# Patient Record
Sex: MALE | Race: WHITE | NOT HISPANIC OR LATINO | Employment: UNEMPLOYED | URBAN - METROPOLITAN AREA
[De-identification: names, ages, dates, MRNs, and addresses within clinical notes are randomized per-mention and may not be internally consistent; named-entity substitution may affect disease eponyms.]

---

## 2017-06-29 ENCOUNTER — OFFICE VISIT (OUTPATIENT)
Dept: FAMILY MEDICINE | Facility: CLINIC | Age: 37
End: 2017-06-29

## 2017-06-29 VITALS
RESPIRATION RATE: 20 BRPM | HEIGHT: 69 IN | WEIGHT: 156 LBS | TEMPERATURE: 97.3 F | HEART RATE: 99 BPM | OXYGEN SATURATION: 97 % | BODY MASS INDEX: 23.11 KG/M2 | DIASTOLIC BLOOD PRESSURE: 80 MMHG | SYSTOLIC BLOOD PRESSURE: 123 MMHG

## 2017-06-29 DIAGNOSIS — Z23 IMMUNIZATION DUE: Primary | ICD-10-CM

## 2017-06-29 NOTE — MR AVS SNAPSHOT
After Visit Summary   6/29/2017    Augustine Matthews    MRN: [id number]           Patient Information     Date Of Birth          1980        Visit Information        Provider Department      6/29/2017 8:20 AM Pastor Borges MD [facility]        Care Instructions    Your medication list is printed, please keep this with you, it is helpful to bring this current list to any other medical appointments, the emergency room or hospital.    If you had lab testing today and your results are reassuring or normal they will be be mailed to you within 7 days.     If the lab tests need quick action we will call you with the results.   The phone number we will call with results is # [phone] (home) . If this is not the best number please call our clinic and change the number.    If you need any refills please call your pharmacy and they will contact us.    If you have any further concerns or wish to schedule another appointment you must call our office during normal business hours  [phone] (8-5:00 M-F)  If you have urgent medical questions that cannot wait  you may also call [phone] at any time of day.  If you have a medical emergency please call 221.    Thank you for coming to [facility].            Follow-ups after your visit        Who to contact     Please call your clinic at [phone] to:    Ask questions about your health    Make or cancel appointments    Discuss your medicines    Learn about your test results    Speak to your doctor   If you have compliments or concerns about an experience at your clinic, or if you wish to file a complaint, please contact [facility] Patient Relations at [phone] or email us at [email or web address]         Additional Information About Your Visit        Care EveryWhere ID     This is your Care EveryWhere ID. This could be used by other organizations to access your [facility]  "records  [id number]        Your Vitals Were     Pulse Temperature Respirations Height Pulse Oximetry BMI (Body Mass Index)    99 97.3  F (36.3  C) 20 5' 9\" (175.3 cm) 97% 23.04 kg/m2       Blood Pressure from Last 3 Encounters:   06/29/17 123/80    Weight from Last 3 Encounters:   06/29/17 156 lb (70.8 kg)              Today, you had the following     No orders found for display       Primary Care Provider Office Phone # Fax #    Pastor Borges [phone]       [facility] [address] [place] [address]        Equal Access to Services     [facility] : Hadii karen salazar hadasho Sorina, waaxda luqadaha, qaybta kaalmada adeegyada, miguel ángel napier . So [facility] [phone].    ATENCIÓN: Si habla español, tiene a mejia disposición servicios gratuitos de asistencia lingüística. Llame al [phone].    We comply with applicable federal civil rights laws and [place] laws. We do not discriminate on the basis of race, color, national origin, age, disability sex, sexual orientation or gender identity.            Thank you!     Thank you for choosing [facility]  for your care. Our goal is always to provide you with excellent care. Hearing back from our patients is one way we can continue to improve our services. Please take a few minutes to complete the written survey that you may receive in the mail after your visit with us. Thank you!             Your Updated Medication List - Protect others around you: Learn how to safely use, store and throw away your medicines at www.disposemymeds.org.      Notice  As of 6/29/2017  9:10 AM    You have not been prescribed any medications.      "

## 2017-06-29 NOTE — PROGRESS NOTES
HPI:       Augustine Matthews is a 37 year old  male who presents for   1. Anxiety  2. Back problem  3. Asthma when younger, now seems resolved  Working at [employer].  Works every day, a shift lasts about 5 hours.  Works from 3.5 to 5 hours per day.  Sometimes his back hurts.  He as a history of degenerative disease of his back, and appears to be having some difficulty moving around today. Pain when he rises to a stnd, or gets of the exam tab.e, or rises from lying down on the exam table.    Has to walk about a mile to get to work and back.    Patient states he has been treated for ADHD when he was young.  No therapy since then.  He was staying with his mother, but had a falling out.  Was then homeless for two years.  Now he is staying in a supportive environment with a male room mate. .  Single, never .  Has four children, they are in the area, and he sees them regularly.    Not on any pills now.    Tobacco- none  Etoh- none  Drugs - none    Family history - Mother has history for depression.  Uncles with depression.     SH- Dropped out of high school at tenth grade. No further education. Happy with the job at [employer], and is afraid if he looses his housing he will also loose his job.    Tetanus - last updated 2006    Mild intermittent asthma, has not had albuterol in several years.                     PMHX:   Current Medications:   No current outpatient prescriptions on file.       Existing Problems  There is no problem list on file for this patient.      Allergies:  Allergies   Allergen Reactions     No Known Allergies        Previous labs:  Lab Results   Component Value Date    .0 06/14/2012    BUN 8.0 06/14/2012    CO2 27.0 06/14/2012               Review of Systems:    CONSTITUTIONAL: no fatigue, no unexpected change in weight  SKIN: no worrisome rashes, no worrisome moles, no worrisome lesions  EYES: no acute vision problems or changes  ENT: no ear problems, no mouth problems, no throat  "problems  RESP: no significant cough, no shortness of breath  CV: no chest pain, no palpitations, no new or worsening peripheral edema  GI: no nausea, no vomiting, no constipation, no diarrhea          Physical Exam:     Vitals:    06/29/17 0831   BP: 123/80   Pulse: 99   Resp: 20   Temp: 97.3  F (36.3  C)   SpO2: 97%   Weight: 156 lb (70.8 kg)   Height: 5' 9\" (175.3 cm)     Body mass index is 23.04 kg/(m^2).    GENERAL:alert, well hydrated, no distress  EYES: Eyes grossly normal to inspection, extraocular movements - intact, and PERRL  HENT: ear canals- normal; TMs- normal; Nose- normal; Mouth- no ulcers, no lesions  NECK: no tenderness, no adenopathy, no asymmetry, no masses, no stiffness; thyroid- normal to palpation  RESP: lungs clear to auscultation - no rales, no rhonchi, no wheezes  CV: regular rates and rhythm, normal S1 S2, no S3 or S4 and no murmur, no click or rub -  Back-subjective limitation to bending to 45 degrees from vertical.  Pain localizing in lower back.No weakness or sensory change in the legs bilaterally.                Labs and Procedures     Historic Results on 06/14/2012   Component Date Value Ref Range Status     Glucose 06/14/2012 111.0* 60.0 - 109.0 mg/dL Final     Urea Nitrogen 06/14/2012 8.0  5.0 - 24.0 mg/dL Final     Creatinine 06/14/2012 1.1  0.8 - 1.5 mg/dL Final     Sodium 06/14/2012 140.0  133.0 - 144.0 mmol/L Final     Potassium 06/14/2012 4.1  3.4 - 5.3 mmol/L Final     Chloride 06/14/2012 103.0  94.0 - 109.0 mmol/L Final     Carbon Dioxide 06/14/2012 27.0  20.0 - 32.0 mmol/L Final     Calcium 06/14/2012 9.3  8.5 - 10.4 mg/dL Final     eGFR Calculated (Non Black Referen* 06/14/2012 82.5  >60.0 ml/min Final     eGFR Calculated (Black Reference) 06/14/2012 99.8  >60.0 ml/min Final              Assessment and Plan     1.Is maintaining a job at [employer] and staying is assisted living.  Walks to work.  2. Lifting and bending are limited by chronic low back pain.  This does not seem to " be an issue in his current position, but is a limiting factor for other forms of work. Can lift up to 40 pounds, but not often or repeatedly.   3. Low health literacy with completion of 9th grade education.  Learning disability by history.  Will need continued support for maintaining job, home, and other capabilities.   4. Completed forms patient brought with him concerning documentation of limitations related to home living situation.          Options for treatment and follow-up care were reviewed with the patient and/or guardian. Augustine Matthews and/or guardian engaged in the decision making process and verbalized understanding of the options discussed and agreed with the final plan.    Pastor Borges MD

## 2017-06-29 NOTE — PATIENT INSTRUCTIONS
Your medication list is printed, please keep this with you, it is helpful to bring this current list to any other medical appointments, the emergency room or hospital.    If you had lab testing today and your results are reassuring or normal they will be be mailed to you within 7 days.     If the lab tests need quick action we will call you with the results.   The phone number we will call with results is # [phone] (home) . If this is not the best number please call our clinic and change the number.    If you need any refills please call your pharmacy and they will contact us.    If you have any further concerns or wish to schedule another appointment you must call our office during normal business hours  [phone] (8-5:00 M-F)  If you have urgent medical questions that cannot wait  you may also call [phone] at any time of day.  If you have a medical emergency please call [phone].    Thank you for coming to [facility].

## 2017-06-30 ASSESSMENT — ASTHMA QUESTIONNAIRES: ACT_TOTALSCORE: 13

## 2017-07-17 PROBLEM — J45.909 UNCOMPLICATED ASTHMA: Status: ACTIVE | Noted: 2017-07-17

## 2017-07-17 PROBLEM — J45.909 UNCOMPLICATED ASTHMA: Status: RESOLVED | Noted: 2017-07-17 | Resolved: 2017-07-17

## 2017-07-23 ENCOUNTER — COMMUNICATION - HEALTHEAST (OUTPATIENT)
Dept: SCHEDULING | Facility: CLINIC | Age: 37
End: 2017-07-23

## 2017-07-27 ENCOUNTER — AMBULATORY - HEALTHEAST (OUTPATIENT)
Dept: SURGERY | Facility: CLINIC | Age: 37
End: 2017-07-27

## 2017-07-27 ENCOUNTER — OFFICE VISIT (OUTPATIENT)
Dept: FAMILY MEDICINE | Facility: CLINIC | Age: 37
End: 2017-07-27

## 2017-07-27 VITALS
DIASTOLIC BLOOD PRESSURE: 83 MMHG | BODY MASS INDEX: 22.46 KG/M2 | WEIGHT: 156.9 LBS | HEART RATE: 84 BPM | HEIGHT: 70 IN | TEMPERATURE: 97.9 F | SYSTOLIC BLOOD PRESSURE: 121 MMHG | OXYGEN SATURATION: 97 %

## 2017-07-27 DIAGNOSIS — K40.90 LEFT INGUINAL HERNIA: ICD-10-CM

## 2017-07-27 DIAGNOSIS — K40.90 UNILATERAL INGUINAL HERNIA WITHOUT OBSTRUCTION OR GANGRENE, RECURRENCE NOT SPECIFIED: Primary | ICD-10-CM

## 2017-07-27 DIAGNOSIS — J45.20 MILD INTERMITTENT ASTHMA WITHOUT COMPLICATION: ICD-10-CM

## 2017-07-27 RX ORDER — ALBUTEROL SULFATE 90 UG/1
1-2 AEROSOL, METERED RESPIRATORY (INHALATION) EVERY 6 HOURS PRN
Qty: 2 INHALER | Refills: 1 | Status: SHIPPED | OUTPATIENT
Start: 2017-07-27 | End: 2017-09-28

## 2017-07-27 RX ORDER — OXYCODONE AND ACETAMINOPHEN 5; 325 MG/1; MG/1
1 TABLET ORAL EVERY 4 HOURS PRN
Qty: 30 TABLET | Refills: 0 | Status: SHIPPED | OUTPATIENT
Start: 2017-07-27 | End: 2018-06-11

## 2017-07-27 NOTE — PATIENT INSTRUCTIONS
Your medication list is printed, please keep this with you, it is helpful to bring this current list to any other medical appointments, the emergency room or hospital.    If you had lab testing today and your results are reassuring or normal they will be be mailed to you within 7 days.     If the lab tests need quick action we will call you with the results.   The phone number we will call with results is # [phone] (home) . If this is not the best number please call our clinic and change the number.    If you need any refills please call your pharmacy and they will contact us.    If you have any further concerns or wish to schedule another appointment you must call our office during normal business hours  [phone] (8-5:00 M-F)  If you have urgent medical questions that cannot wait  you may also call [phone] at any time of day.  If you have a medical emergency please call [phone].    Thank you for coming to [facility].      Referral for ( TEST )  :      General Surgery  LOCATION/PLACE/Provider :     Surgery [address] [place] [address], [address]  [place] [address]  DATE & TIME :     7- at 10:30am  PHONE :     [phone]  FAX :     [phone]  ADDITIONAL INFORMATION :     NA  Appointment made by clinic staff/:    ANUEL    8/2/17 Surgical notes to Dr. Borges. PRETTY Saha (c)

## 2017-07-27 NOTE — PROGRESS NOTES
"       HPI:       Augustine Matthews is a 37 year old  male who presents for presents for onset of a left sided hrnia.   Patient was working when he sudeenly noted the onset of a left sided inhuinal pain assocaited with a small hernia.  Went to ER where he was advised that he had a hernia, and given some pain medication. Presents today for further evaluation.   Patient presents with 8 cm bulge in the right inguinal canal.  Not fully reducible. But decompresses when the patient is lying down. Made worse by lifting.  Patient has been aware of the presence of a small hernia for a long time, but it recently has increased in size dramatically.      History of mild intermittent asthma, and would like a refill of albuterol.            PMHX:   Current Medications:   No current outpatient prescriptions on file.       Existing Problems  Patient Active Problem List   Diagnosis   (none) - all problems resolved or deleted       Allergies:  Allergies   Allergen Reactions     No Known Allergies        Previous labs:  Lab Results   Component Value Date    .0 06/14/2012    BUN 8.0 06/14/2012    CO2 27.0 06/14/2012               Review of Systems:    CONSTITUTIONAL: no fatigue, no unexpected change in weight  SKIN: no worrisome rashes, no worrisome moles, no worrisome lesions  EYES: no acute vision problems or changes  ENT: no ear problems, no mouth problems, no throat problems  RESP: no significant cough, no shortness of breath  CV: no chest pain, no palpitations, no new or worsening peripheral edema  GI: no nausea, no vomiting, no constipation, no diarrhea          Physical Exam:     Vitals:    07/27/17 1146   BP: 121/83   BP Location: Right arm   Patient Position: Chair   Cuff Size: Adult Regular   Pulse: 84   Temp: 97.9  F (36.6  C)   TempSrc: Oral   SpO2: 97%   Weight: 156 lb 14.4 oz (71.2 kg)   Height: 5' 9.75\" (177.2 cm)     Body mass index is 22.67 kg/(m^2).    GENERAL:alert, well hydrated, no distress  EYES: Eyes " grossly normal to inspection, extraocular movements - intact, and PERRL  HENT:  Nose- normal; Mouth- no ulcers, no lesions  NECK: no tenderness, no adenopathy, no asymmetry, no masses, no stiffness;  RESP: lungs clear to auscultation - no rales, no rhonchi, no wheezes  CV: regular rates and rhythm, normal S1 S2, no S3 or S4 and no murmur, no click or rub -  ABDOMEN: soft, 8 cm bulge in the right inguinal canal, mild tenderness to palpation. Appears direct with a broad based defect. Soft, and easily compressible.                   Labs and Procedures     Historic Results on 06/14/2012   Component Date Value Ref Range Status     Glucose 06/14/2012 111.0* 60.0 - 109.0 mg/dL Final     Urea Nitrogen 06/14/2012 8.0  5.0 - 24.0 mg/dL Final     Creatinine 06/14/2012 1.1  0.8 - 1.5 mg/dL Final     Sodium 06/14/2012 140.0  133.0 - 144.0 mmol/L Final     Potassium 06/14/2012 4.1  3.4 - 5.3 mmol/L Final     Chloride 06/14/2012 103.0  94.0 - 109.0 mmol/L Final     Carbon Dioxide 06/14/2012 27.0  20.0 - 32.0 mmol/L Final     Calcium 06/14/2012 9.3  8.5 - 10.4 mg/dL Final     eGFR Calculated (Non Black Referen* 06/14/2012 82.5  >60.0 ml/min Final     eGFR Calculated (Black Reference) 06/14/2012 99.8  >60.0 ml/min Final              Assessment and Plan     1.Refer to surgery for repair of left inguinal hernia.   2. Refill albuterol. Patient uses this occasionally, but less than 2-3 times per week.       Options for treatment and follow-up care were reviewed with the patient and/or guardian. Augustine Matthews and/or guardian engaged in the decision making process and verbalized understanding of the options discussed and agreed with the final plan.    Pastor Borges MD

## 2017-07-27 NOTE — MR AVS SNAPSHOT
After Visit Summary   7/27/2017    Augustine Matthews    MRN: [id number]           Patient Information     Date Of Birth          1980        Visit Information        Provider Department      7/27/2017 11:40 AM Pastor Borges MD [facility]        Today's Diagnoses     Unilateral inguinal hernia without obstruction or gangrene, recurrence not specified    -  1    Mild intermittent asthma without complication          Care Instructions    Your medication list is printed, please keep this with you, it is helpful to bring this current list to any other medical appointments, the emergency room or hospital.    If you had lab testing today and your results are reassuring or normal they will be be mailed to you within 7 days.     If the lab tests need quick action we will call you with the results.   The phone number we will call with results is # [phone] (home) . If this is not the best number please call our clinic and change the number.    If you need any refills please call your pharmacy and they will contact us.    If you have any further concerns or wish to schedule another appointment you must call our office during normal business hours  [phone] (8-5:00 M-F)  If you have urgent medical questions that cannot wait  you may also call [phone] at any time of day.  If you have a medical emergency please call [phone].    Thank you for coming to [facility].            Follow-ups after your visit        Additional Services     GENERAL SURG ADULT REFERRAL       Reason for Referral: Left side inguinal hernia     needed: No  Language: English    May leave message on voicemail: Yes    ([facility] Only) Referral should be tracked (Yes/No)?                  Who to contact     Please call your clinic at [phone] to:    Ask questions about your health    Make or cancel appointments    Discuss your medicines    Learn about your test results    Speak to your doctor   If  "you have compliments or concerns about an experience at your clinic, or if you wish to file a complaint, please contact [facility] Patient Relations at [phone] or email us at [email or web address]         Additional Information About Your Visit        Care EveryWhere ID     This is your Care EveryWhere ID. This could be used by other organizations to access your [place] medical records  [id number]        Your Vitals Were     Pulse Temperature Height Pulse Oximetry BMI (Body Mass Index)       84 97.9  F (36.6  C) (Oral) 5' 9.75\" (177.2 cm) 97% 22.67 kg/m2        Blood Pressure from Last 3 Encounters:   07/27/17 121/83   06/29/17 123/80    Weight from Last 3 Encounters:   07/27/17 156 lb 14.4 oz (71.2 kg)   06/29/17 156 lb (70.8 kg)              We Performed the Following     GENERAL SURG ADULT REFERRAL          Today's Medication Changes          These changes are accurate as of: 7/27/17 12:11 PM.  If you have any questions, ask your nurse or doctor.               Start taking these medicines.        Dose/Directions    albuterol 108 (90 BASE) MCG/ACT Inhaler   Commonly known as:  PROAIR HFA/PROVENTIL HFA/VENTOLIN HFA   Used for:  Mild intermittent asthma without complication   Started by:  Pastor Borges MD        Dose:  1-2 puff   Inhale 1-2 puffs into the lungs every 6 hours as needed for shortness of breath / dyspnea or wheezing   Quantity:  2 Inhaler   Refills:  1       oxyCODONE-acetaminophen 5-325 MG per tablet   Commonly known as:  PERCOCET   Used for:  Unilateral inguinal hernia without obstruction or gangrene, recurrence not specified   Started by:  Pastor Borges MD        Dose:  1 tablet   Take 1 tablet by mouth every 4 hours as needed for pain maximum 8 tablet(s) per day   Quantity:  30 tablet   Refills:  0            Where to get your medicines      These medications were sent to [facility] [address] [place] [address] - [address] [place] [address] AT " [address] [address]    Hours:  24-hours Phone:  [phone]     albuterol 108 (90 BASE) MCG/ACT Inhaler         Some of these will need a paper prescription and others can be bought over the counter.  Ask your nurse if you have questions.     Bring a paper prescription for each of these medications     oxyCODONE-acetaminophen 5-325 MG per tablet                Primary Care Provider Office Phone # Fax #    Pastor Borges [phone]       [facility] [address] [place] [address]        Equal Access to Services     [facility]: Hadii aad ku hadasho Soomaali, waaxda luqadaha, qaybta kaalmada adeegyada, waxay idiin hayaan adeleah napier . So [facility] [phone].    ATENCIÓN: Si habla español, tiene a mejia disposición servicios gratuitos de asistencia lingüística. [facility] [phone].    We comply with applicable federal civil rights laws and [place] laws. We do not discriminate on the basis of race, color, national origin, age, disability sex, sexual orientation or gender identity.            Thank you!     Thank you for choosing [facility]  for your care. Our goal is always to provide you with excellent care. Hearing back from our patients is one way we can continue to improve our services. Please take a few minutes to complete the written survey that you may receive in the mail after your visit with us. Thank you!             Your Updated Medication List - Protect others around you: Learn how to safely use, store and throw away your medicines at www.disposemymeds.org.          This list is accurate as of: 7/27/17 12:11 PM.  Always use your most recent med list.                   Brand Name Dispense Instructions for use Diagnosis    albuterol 108 (90 BASE) MCG/ACT Inhaler    PROAIR HFA/PROVENTIL HFA/VENTOLIN HFA    2 Inhaler    Inhale 1-2 puffs into the lungs every 6 hours as needed for shortness of  breath / dyspnea or wheezing    Mild intermittent asthma without complication       oxyCODONE-acetaminophen 5-325 MG per tablet    PERCOCET    30 tablet    Take 1 tablet by mouth every 4 hours as needed for pain maximum 8 tablet(s) per day    Unilateral inguinal hernia without obstruction or gangrene, recurrence not specified

## 2017-07-31 ENCOUNTER — TRANSFERRED RECORDS (OUTPATIENT)
Dept: HEALTH INFORMATION MANAGEMENT | Facility: CLINIC | Age: 37
End: 2017-07-31

## 2017-07-31 ENCOUNTER — OFFICE VISIT - HEALTHEAST (OUTPATIENT)
Dept: SURGERY | Facility: CLINIC | Age: 37
End: 2017-07-31

## 2017-07-31 DIAGNOSIS — K40.20 BILATERAL INGUINAL HERNIA WITHOUT OBSTRUCTION OR GANGRENE, RECURRENCE NOT SPECIFIED: ICD-10-CM

## 2017-07-31 ASSESSMENT — MIFFLIN-ST. JEOR: SCORE: 1616.6

## 2017-08-01 ENCOUNTER — AMBULATORY - HEALTHEAST (OUTPATIENT)
Dept: SURGERY | Facility: CLINIC | Age: 37
End: 2017-08-01

## 2017-08-02 ENCOUNTER — RECORDS - HEALTHEAST (OUTPATIENT)
Dept: ADMINISTRATIVE | Facility: OTHER | Age: 37
End: 2017-08-02

## 2017-09-14 ENCOUNTER — AMBULATORY - HEALTHEAST (OUTPATIENT)
Dept: SURGERY | Facility: CLINIC | Age: 37
End: 2017-09-14

## 2017-09-14 ENCOUNTER — RECORDS - HEALTHEAST (OUTPATIENT)
Dept: ADMINISTRATIVE | Facility: OTHER | Age: 37
End: 2017-09-14

## 2017-09-18 ENCOUNTER — COMMUNICATION - HEALTHEAST (OUTPATIENT)
Dept: SURGERY | Facility: CLINIC | Age: 37
End: 2017-09-18

## 2017-09-18 DIAGNOSIS — G89.18 POST-OP PAIN: ICD-10-CM

## 2017-09-25 PROBLEM — J45.20 MILD INTERMITTENT ASTHMA WITHOUT COMPLICATION: Status: ACTIVE | Noted: 2017-09-25

## 2017-09-26 ENCOUNTER — RECORDS - HEALTHEAST (OUTPATIENT)
Dept: ADMINISTRATIVE | Facility: OTHER | Age: 37
End: 2017-09-26

## 2017-09-28 ENCOUNTER — OFFICE VISIT (OUTPATIENT)
Dept: FAMILY MEDICINE | Facility: CLINIC | Age: 37
End: 2017-09-28

## 2017-09-28 VITALS
HEIGHT: 69 IN | BODY MASS INDEX: 21.98 KG/M2 | DIASTOLIC BLOOD PRESSURE: 77 MMHG | SYSTOLIC BLOOD PRESSURE: 148 MMHG | WEIGHT: 148.4 LBS | TEMPERATURE: 97.5 F | HEART RATE: 115 BPM | OXYGEN SATURATION: 99 %

## 2017-09-28 DIAGNOSIS — K40.90 UNILATERAL INGUINAL HERNIA WITHOUT OBSTRUCTION OR GANGRENE, RECURRENCE NOT SPECIFIED: Primary | ICD-10-CM

## 2017-09-28 DIAGNOSIS — J45.20 MILD INTERMITTENT ASTHMA WITHOUT COMPLICATION: ICD-10-CM

## 2017-09-28 RX ORDER — ALBUTEROL SULFATE 90 UG/1
1-2 AEROSOL, METERED RESPIRATORY (INHALATION) EVERY 6 HOURS PRN
Qty: 2 INHALER | Refills: 3 | Status: SHIPPED | OUTPATIENT
Start: 2017-09-28 | End: 2018-06-11

## 2017-09-28 ASSESSMENT — PAIN SCALES - GENERAL: PAINLEVEL: EXTREME PAIN (8)

## 2017-09-28 NOTE — MR AVS SNAPSHOT
After Visit Summary   9/28/2017    Augustine Matthews    MRN: [id number]           Patient Information     Date Of Birth          1980        Visit Information        Provider Department      9/28/2017 11:40 AM Pastor Borges MD [facility]        Today's Diagnoses     Mild intermittent asthma without complication          Care Instructions    Start using Pulmicort or Budesonide 2 puffs at night and 2 puffs in the morning.      Your medication list is printed, please keep this with you, it is helpful to bring this current list to any other medical appointments, the emergency room or hospital.    If you had lab testing today and your results are reassuring or normal they will be be mailed to you within 7 days.     If the lab tests need quick action we will call you with the results.   The phone number we will call with results is # [phone] (home) . If this is not the best number please call our clinic and change the number.    If you need any refills please call your pharmacy and they will contact us.    If you have any further concerns or wish to schedule another appointment you must call our office during normal business hours  [phone] (8-5:00 M-F)  If you have urgent medical questions that cannot wait  you may also call [phone] at any time of day.  If you have a medical emergency please call [phone].    Thank you for coming to [facility].    My Asthma Action Plan  Name: Augustine Matthews  YOB: 1980  Date: 9/28/2017   My doctor: Pastor Borges   My clinic:   [facility] [address] [place] [address]  [phone]    My Asthma Severity: mild persistent Avoid your asthma triggers: dust mites and pollens      GREEN ZONE   Good Control    I feel good    No cough or wheeze    Can work, sleep and play without asthma symptoms       Take your asthma control medicine every day.  Take the medications listed below daily.    Pulmicort   90 mcg 2 puffs twice a day    1. If exercise triggers your asthma, take your rescue medication (2 puffs of albuterol, Ventolin/Pro-Air) 15 minutes before exercise or sports, and during exercise if you have asthma symptoms.  2. Spacer to use with inhaler: If you have a spacer, make sure to use it with your inhaler.              YELLOW ZONE Getting Worse  I have ANY of these:    I do not feel good    Cough or wheeze    Chest feels tight    Wake up at night   1. Keep taking your Green Zone medications.  2. Start taking your rescue medicine (1-2 puffs of albuterol - Ventolin/Pro-Air) every 4-6 hours as needed.  3. If symptoms are not controlled with above, can take 2 puffs every 20 minutes for up to 1 hour, then continue every 4 hours if needed.   4. If you do not return to the Green Zone in 12-24 hours or you get worse, call the clinic.         RED ZONE Medical Alert - Get Help  I have ANY of these:    I feel awful    Medicine is not helping    Breathing getting harder    Trouble walking or talking    Nose opens wide to breathe       1. Take your rescue medicine NOW (6-8 puffs of albuterol - Ventolin/Pro-Air) for every 20 minutes for up to 1 hour.  2. If your provider has prescribed an oral steroid medicine (Prednisone 40 mg), start taking it NOW.  3. Call your doctor NOW.  4. If you are still in the Red Zone after 20 minutes and you have not reached your doctor:    Take your rescue medicine again (6-8 puffs of albuterol - Ventolin/Pro-Air) and    Call 911 or go to the emergency room right away    See your regular doctor within 1 weeks of an Emergency Room or Urgent Care visit for follow-up treatment.        This Asthma Action Plan provides authorization for the administration of medication described in the AAP.  YES  This child has the knowledge and skills to self-administer rescue medication at school or  with approval of the school nurse.  YES    Electronically signed by: Pastor Sagastume  "Reminders:  Meet with Asthma Educator,  Flu Shot in the Fall, Pneumonia Shot  Pharmacy: [facility] [address] [place] [address] BRADY MONTOYA AT [facility]          Follow-ups after your visit        Who to contact     Please call your clinic at [phone] to:    Ask questions about your health    Make or cancel appointments    Discuss your medicines    Learn about your test results    Speak to your doctor   If you have compliments or concerns about an experience at your clinic, or if you wish to file a complaint, please contact [facility] Patient Relations at [phone] or email us at [email or web address]         Additional Information About Your Visit        Care EveryWhere ID     This is your Care EveryWhere ID. This could be used by other organizations to access your [facility] medical records  [id number]        Your Vitals Were     Pulse Temperature Height Pulse Oximetry BMI (Body Mass Index)       115 97.5  F (36.4  C) (Oral) 5' 8.75\" (174.6 cm) 99% 22.07 kg/m2        Blood Pressure from Last 3 Encounters:   09/28/17 148/77   07/27/17 121/83   06/29/17 123/80    Weight from Last 3 Encounters:   09/28/17 148 lb 6.4 oz (67.3 kg)   07/27/17 156 lb 14.4 oz (71.2 kg)   06/29/17 156 lb (70.8 kg)              We Performed the Following     Asthma Action Plan (AAP)          Today's Medication Changes          These changes are accurate as of: 9/28/17 12:15 PM.  If you have any questions, ask your nurse or doctor.               Start taking these medicines.        Dose/Directions    BUDESONIDE (INHALATION) 90 MCG/ACT Aepb   Used for:  Mild intermittent asthma without complication   Started by:  Pastor Borges MD        Dose:  2 puff   Inhale 2 puffs into the lungs 2 times daily   Quantity:  1 each   Refills:  1            Where to get your medicines      These medications were sent to [facility] [address] [place] [address] BRADY" [facility] AT [address] [address]    Hours:  24-hours Phone:  [phone]     albuterol 108 (90 BASE) MCG/ACT Inhaler    BUDESONIDE (INHALATION) 90 MCG/ACT Aepb                Primary Care Provider Office Phone # Fax #    Pastor Borges [phone]       [facility] [address] [place] [address]        Equal Access to Services     [facility] : Hadii aad ku hadasho Soomaali, waaxda luqadaha, qaybta kaalmada adeegyada, waxay idiin hayaan adeeg kharash la'aan . [facility] [phone].    ATENCIÓN: Si habla español, tiene a mejia disposición servicios gratuitos de asistencia lingüística. [facility] [phone].    We comply with applicable federal civil rights laws and [place] laws. We do not discriminate on the basis of race, color, national origin, age, disability sex, sexual orientation or gender identity.            Thank you!     Thank you for choosing [facility]  for your care. Our goal is always to provide you with excellent care. Hearing back from our patients is one way we can continue to improve our services. Please take a few minutes to complete the written survey that you may receive in the mail after your visit with us. Thank you!             Your Updated Medication List - Protect others around you: Learn how to safely use, store and throw away your medicines at www.disposemymeds.org.          This list is accurate as of: 9/28/17 12:15 PM.  Always use your most recent med list.                   Brand Name Dispense Instructions for use Diagnosis    albuterol 108 (90 BASE) MCG/ACT Inhaler    PROAIR HFA/PROVENTIL HFA/VENTOLIN HFA    2 Inhaler    Inhale 1-2 puffs into the lungs every 6 hours as needed for shortness of breath / dyspnea or wheezing    Mild intermittent asthma without complication       BUDESONIDE (INHALATION) 90 MCG/ACT Aepb     1 each    Inhale 2 puffs into the lungs 2 times daily    Mild  intermittent asthma without complication       oxyCODONE-acetaminophen 5-325 MG per tablet    PERCOCET    30 tablet    Take 1 tablet by mouth every 4 hours as needed for pain maximum 8 tablet(s) per day    Unilateral inguinal hernia without obstruction or gangrene, recurrence not specified

## 2017-09-28 NOTE — PROGRESS NOTES
HPI:       Augustine Matthews is a 37 year old  male who presents for recheck of hernia operation and asthma.   Patient had repair of left inguinal hernia two weeks ago. Has not seen the surgeon yet. Was concerned that he had persistent left groin and leg muscle spasms at times. No wound pain, but bandages still present. Dirty and blood stained.      Has been using more albuterol since his operation. Now using albuterol 1-2 times per day.  Will check Asthma action plan and provide a controller medicine for now.                PMHX:   Current Medications:   Current Outpatient Prescriptions   Medication Sig Dispense Refill     albuterol (PROAIR HFA/PROVENTIL HFA/VENTOLIN HFA) 108 (90 BASE) MCG/ACT Inhaler Inhale 1-2 puffs into the lungs every 6 hours as needed for shortness of breath / dyspnea or wheezing 2 Inhaler 3     BUDESONIDE, INHALATION, 90 MCG/ACT AEPB Inhale 2 puffs into the lungs 2 times daily 1 each 1     oxyCODONE-acetaminophen (PERCOCET) 5-325 MG per tablet Take 1 tablet by mouth every 4 hours as needed for pain maximum 8 tablet(s) per day (Patient not taking: Reported on 9/28/2017) 30 tablet 0     [DISCONTINUED] albuterol (PROAIR HFA/PROVENTIL HFA/VENTOLIN HFA) 108 (90 BASE) MCG/ACT Inhaler Inhale 1-2 puffs into the lungs every 6 hours as needed for shortness of breath / dyspnea or wheezing 2 Inhaler 1       Existing Problems  Patient Active Problem List   Diagnosis     Mild intermittent asthma without complication       Allergies:  Allergies   Allergen Reactions     No Known Allergies        Previous labs:  Lab Results   Component Value Date    .0 06/14/2012    BUN 8.0 06/14/2012    CO2 27.0 06/14/2012               Review of Systems:    CONSTITUTIONAL: no fatigue, no unexpected change in weight  SKIN: no worrisome rashes, no worrisome moles, no worrisome lesions  EYES: no acute vision problems or changes  ENT: no ear problems, no mouth problems, no throat problems  RESP: no significant  "cough  CV: no chest pain, no palpitations, no new or worsening peripheral edema  GI: no nausea, no vomiting, no constipation, no diarrhea          Physical Exam:     Vitals:    09/28/17 1145   BP: 148/77   Pulse: 115   Temp: 97.5  F (36.4  C)   TempSrc: Oral   SpO2: 99%   Weight: 148 lb 6.4 oz (67.3 kg)   Height: 5' 8.75\" (174.6 cm)     Body mass index is 22.07 kg/(m^2).    GENERAL:alert, well hydrated, no distress  EYES: Eyes grossly normal to inspection, extraocular movements - intact, and PERRL  HENT:Nose- normal; Mouth- no ulcers, no lesions  NECK: no tenderness, no adenopathy, no asymmetry, no masses, no stiffness  RESP: lungs clear to auscultation - no rales, no rhonchi, no wheezes  CV: regular rates and rhythm, normal S1 S2, no S3 or S4 and no murmur, no click or rub -  ABDOMEN: removed dirty bandages.  Wound looks good, no erythema, no drainage. Testicles normal. Stab wound normal. No further bandages placed at this time.              Labs and Procedures     Historic Results on 06/14/2012   Component Date Value Ref Range Status     Glucose 06/14/2012 111.0* 60.0 - 109.0 mg/dL Final     Urea Nitrogen 06/14/2012 8.0  5.0 - 24.0 mg/dL Final     Creatinine 06/14/2012 1.1  0.8 - 1.5 mg/dL Final     Sodium 06/14/2012 140.0  133.0 - 144.0 mmol/L Final     Potassium 06/14/2012 4.1  3.4 - 5.3 mmol/L Final     Chloride 06/14/2012 103.0  94.0 - 109.0 mmol/L Final     Carbon Dioxide 06/14/2012 27.0  20.0 - 32.0 mmol/L Final     Calcium 06/14/2012 9.3  8.5 - 10.4 mg/dL Final     eGFR Calculated (Non Black Referen* 06/14/2012 82.5  >60.0 ml/min Final     eGFR Calculated (Black Reference) 06/14/2012 99.8  >60.0 ml/min Final              Assessment and Plan     1.Inguinal hernia - discussed recovery, and reassurred that appears to be normal healing.   2. Reviewed asthma action plan. Started pulmacort controller medicine in order to reduct the frequency of the albuterol.       Options for treatment and follow-up care were " reviewed with the patient and/or guardian. Augustine Matthews and/or guardian engaged in the decision making process and verbalized understanding of the options discussed and agreed with the final plan.    Pastor Borges MD

## 2017-09-28 NOTE — PATIENT INSTRUCTIONS
Start using Pulmicort or Budesonide 2 puffs at night and 2 puffs in the morning.      Your medication list is printed, please keep this with you, it is helpful to bring this current list to any other medical appointments, the emergency room or hospital.    If you had lab testing today and your results are reassuring or normal they will be be mailed to you within 7 days.     If the lab tests need quick action we will call you with the results.   The phone number we will call with results is # [phone] (home) . If this is not the best number please call our clinic and change the number.    If you need any refills please call your pharmacy and they will contact us.    If you have any further concerns or wish to schedule another appointment you must call our office during normal business hours  [phone] (8-5:00 M-F)  If you have urgent medical questions that cannot wait  you may also call [phone] at any time of day.  If you have a medical emergency please call 911.    Thank you for coming to [facility].    My Asthma Action Plan  Name: Augustine Matthews  YOB: 1980  Date: 9/28/2017   My doctor: Pastor Borges   My clinic:   [facility] [address] [place] [address]  [phone]    My Asthma Severity: mild persistent Avoid your asthma triggers: dust mites and pollens      GREEN ZONE   Good Control    I feel good    No cough or wheeze    Can work, sleep and play without asthma symptoms       Take your asthma control medicine every day.  Take the medications listed below daily.    Pulmicort  90 mcg 2 puffs twice a day    1. If exercise triggers your asthma, take your rescue medication (2 puffs of albuterol, Ventolin/Pro-Air) 15 minutes before exercise or sports, and during exercise if you have asthma symptoms.  2. Spacer to use with inhaler: If you have a spacer, make sure to use it with your inhaler.              YELLOW ZONE Getting Worse  I have ANY of  these:    I do not feel good    Cough or wheeze    Chest feels tight    Wake up at night   1. Keep taking your Green Zone medications.  2. Start taking your rescue medicine (1-2 puffs of albuterol - Ventolin/Pro-Air) every 4-6 hours as needed.  3. If symptoms are not controlled with above, can take 2 puffs every 20 minutes for up to 1 hour, then continue every 4 hours if needed.   4. If you do not return to the Green Zone in 12-24 hours or you get worse, call the clinic.         RED ZONE Medical Alert - Get Help  I have ANY of these:    I feel awful    Medicine is not helping    Breathing getting harder    Trouble walking or talking    Nose opens wide to breathe       1. Take your rescue medicine NOW (6-8 puffs of albuterol - Ventolin/Pro-Air) for every 20 minutes for up to 1 hour.  2. If your provider has prescribed an oral steroid medicine (Prednisone 40 mg), start taking it NOW.  3. Call your doctor NOW.  4. If you are still in the Red Zone after 20 minutes and you have not reached your doctor:    Take your rescue medicine again (6-8 puffs of albuterol - Ventolin/Pro-Air) and    Call 911 or go to the emergency room right away    See your regular doctor within 1 weeks of an Emergency Room or Urgent Care visit for follow-up treatment.        This Asthma Action Plan provides authorization for the administration of medication described in the AAP.  YES  This child has the knowledge and skills to self-administer rescue medication at school or  with approval of the school nurse.  YES    Electronically signed by: Pastor Borges    Annual Reminders:  Meet with Asthma Educator,  Flu Shot in the Fall, Pneumonia Shot  Pharmacy: [facility] [address] [place] [address]

## 2017-09-29 ASSESSMENT — ASTHMA QUESTIONNAIRES: ACT_TOTALSCORE: 14

## 2017-10-06 ENCOUNTER — COMMUNICATION - HEALTHEAST (OUTPATIENT)
Dept: SURGERY | Facility: CLINIC | Age: 37
End: 2017-10-06

## 2017-10-18 ENCOUNTER — COMMUNICATION - HEALTHEAST (OUTPATIENT)
Dept: SURGERY | Facility: CLINIC | Age: 37
End: 2017-10-18

## 2018-06-11 ENCOUNTER — OFFICE VISIT (OUTPATIENT)
Dept: FAMILY MEDICINE | Facility: CLINIC | Age: 38
End: 2018-06-11
Payer: COMMERCIAL

## 2018-06-11 VITALS
HEIGHT: 69 IN | BODY MASS INDEX: 22.69 KG/M2 | HEART RATE: 131 BPM | WEIGHT: 153.2 LBS | DIASTOLIC BLOOD PRESSURE: 96 MMHG | OXYGEN SATURATION: 98 % | SYSTOLIC BLOOD PRESSURE: 129 MMHG | TEMPERATURE: 98.2 F

## 2018-06-11 DIAGNOSIS — M54.42 CHRONIC BILATERAL LOW BACK PAIN WITH BILATERAL SCIATICA: ICD-10-CM

## 2018-06-11 DIAGNOSIS — G89.29 CHRONIC BILATERAL LOW BACK PAIN WITH BILATERAL SCIATICA: ICD-10-CM

## 2018-06-11 DIAGNOSIS — F90.0 ATTENTION DEFICIT HYPERACTIVITY DISORDER (ADHD), PREDOMINANTLY INATTENTIVE TYPE: ICD-10-CM

## 2018-06-11 DIAGNOSIS — Z11.3 SCREENING EXAMINATION FOR SEXUALLY TRANSMITTED DISEASE: ICD-10-CM

## 2018-06-11 DIAGNOSIS — M54.41 CHRONIC BILATERAL LOW BACK PAIN WITH BILATERAL SCIATICA: ICD-10-CM

## 2018-06-11 DIAGNOSIS — J45.20 MILD INTERMITTENT ASTHMA WITHOUT COMPLICATION: Primary | ICD-10-CM

## 2018-06-11 DIAGNOSIS — J45.30 MILD PERSISTENT ASTHMA WITHOUT COMPLICATION: ICD-10-CM

## 2018-06-11 LAB — HIV 1+2 AB+HIV1 P24 AG SERPL QL IA: NEGATIVE

## 2018-06-11 RX ORDER — ALBUTEROL SULFATE 90 UG/1
1-2 AEROSOL, METERED RESPIRATORY (INHALATION) EVERY 6 HOURS PRN
Qty: 2 INHALER | Refills: 3 | Status: SHIPPED | OUTPATIENT
Start: 2018-06-11 | End: 2019-03-14

## 2018-06-11 NOTE — PATIENT INSTRUCTIONS
-  Start using Qvar inhaler 2 puffs into your lungs, 2 times daily.  - Try to decrease the use of albuterol inhaler, you should only be using that inhaler 3 times a week.  - If you are having to use albuterol inhaler more frequently, come back in to have medication rechecked.    Your medication list is printed, please keep this with you, it is helpful to bring this current list to any other medical appointments, the emergency room or hospital.    If you had lab testing today and your results are reassuring or normal they will be be mailed to you within 7 days.     If the lab tests need quick action we will call you with the results.   The phone number we will call with results is # [phone] (home) . If this is not the best number please call our clinic and change the number.    If you need any refills please call your pharmacy and they will contact us.    If you have any further concerns or wish to schedule another appointment you must call our office during normal business hours  [phone] (8-5:00 M-F)  If you have urgent medical questions that cannot wait  you may also call [phone] at any time of day.  If you have a medical emergency please call [phone].    Thank you for coming to [facility].      My Asthma Action Plan  Name: Augustine Matthews  YOB: 1980  Date: 6/11/2018   My doctor: Pastor Borges   My clinic:   [facility] [address] [place] [address]  [phone]    My Asthma Severity: mild persistent Avoid your asthma triggers: dust mites, pollens, animal dander and humidity      GREEN ZONE   Good Control    I feel good    No cough or wheeze    Can work, sleep and play without asthma symptoms       Take your asthma control medicine every day.  Take the medications listed below daily.    QVAR  80 mcg 2 puffs twice a day    1. If exercise triggers your asthma, take your rescue medication (2 puffs of albuterol, Ventolin/Pro-Air) 15 minutes before  exercise or sports, and during exercise if you have asthma symptoms.  2. Spacer to use with inhaler: If you have a spacer, make sure to use it with your inhaler.              YELLOW ZONE Getting Worse  I have ANY of these:    I do not feel good    Cough or wheeze    Chest feels tight    Wake up at night   1. Keep taking your Green Zone medications.  2. Start taking your rescue medicine (1-2 puffs of albuterol - Ventolin/Pro-Air) every 4-6 hours as needed.  3. If symptoms are not controlled with above, can take 2 puffs every 20 minutes for up to 1 hour, then continue every 4 hours if needed.   4. If you do not return to the Green Zone in 12-24 hours or you get worse, call the clinic.         RED ZONE Medical Alert - Get Help  I have ANY of these:    I feel awful    Medicine is not helping    Breathing getting harder    Trouble walking or talking    Nose opens wide to breathe       1. Take your rescue medicine NOW (6-8 puffs of albuterol - Ventolin/Pro-Air) for every 20 minutes for up to 1 hour.  2. If your provider has prescribed an oral steroid medicine (Prednisone 20 mg), start taking it NOW.  3. Call your doctor NOW.  4. If you are still in the Red Zone after 20 minutes and you have not reached your doctor:    Take your rescue medicine again (6-8 puffs of albuterol - Ventolin/Pro-Air) and    Call 911 or go to the emergency room right away    See your regular doctor within 1 weeks of an Emergency Room or Urgent Care visit for follow-up treatment.        This Asthma Action Plan provides authorization for the administration of medication described in the AAP.  YES    Electronically signed by: Pastor Borges    Annual Reminders:  Meet with Asthma Educator,  Flu Shot in the Fall, Pneumonia Shot  Pharmacy: [facility] [address] [place] [address]

## 2018-06-11 NOTE — MR AVS SNAPSHOT
After Visit Summary   6/11/2018    Augustine Matthews    MRN: [id number]           Patient Information     Date Of Birth          1980        Visit Information        Provider Department      6/11/2018 2:20 PM Pastor Borges MD [facility]        Today's Diagnoses     Mild intermittent asthma without complication    -  1    Screening examination for sexually transmitted disease        Mild persistent asthma without complication        Attention deficit hyperactivity disorder (ADHD), predominantly inattentive type        Chronic bilateral low back pain with bilateral sciatica          Care Instructions    -  Start using Qvar inhaler 2 puffs into your lungs, 2 times daily.  - Try to decrease the use of albuterol inhaler, you should only be using that inhaler 3 times a week.  - If you are having to use albuterol inhaler more frequently, come back in to have medication rechecked.    Your medication list is printed, please keep this with you, it is helpful to bring this current list to any other medical appointments, the emergency room or hospital.    If you had lab testing today and your results are reassuring or normal they will be be mailed to you within 7 days.     If the lab tests need quick action we will call you with the results.   The phone number we will call with results is # [phone] (home) . If this is not the best number please call our clinic and change the number.    If you need any refills please call your pharmacy and they will contact us.    If you have any further concerns or wish to schedule another appointment you must call our office during normal business hours  [phone] (8-5:00 M-F)  If you have urgent medical questions that cannot wait  you may also call [phone] at any time of day.  If you have a medical emergency please call 248.    Thank you for coming to [facility].      My Asthma Action Plan  Name: Augustine Matthews  Date of Birth:   1980  Date: 6/11/2018   My doctor: Pastor Borges   My clinic:   [facility] [address] [place] [address]  [phone]    My Asthma Severity: mild persistent Avoid your asthma triggers: dust mites, pollens, animal dander and humidity      GREEN ZONE   Good Control    I feel good    No cough or wheeze    Can work, sleep and play without asthma symptoms       Take your asthma control medicine every day.  Take the medications listed below daily.    QVAR  80 mcg 2 puffs twice a day    1. If exercise triggers your asthma, take your rescue medication (2 puffs of albuterol, Ventolin/Pro-Air) 15 minutes before exercise or sports, and during exercise if you have asthma symptoms.  2. Spacer to use with inhaler: If you have a spacer, make sure to use it with your inhaler.              YELLOW ZONE Getting Worse  I have ANY of these:    I do not feel good    Cough or wheeze    Chest feels tight    Wake up at night   1. Keep taking your Green Zone medications.  2. Start taking your rescue medicine (1-2 puffs of albuterol - Ventolin/Pro-Air) every 4-6 hours as needed.  3. If symptoms are not controlled with above, can take 2 puffs every 20 minutes for up to 1 hour, then continue every 4 hours if needed.   4. If you do not return to the Green Zone in 12-24 hours or you get worse, call the clinic.         RED ZONE Medical Alert - Get Help  I have ANY of these:    I feel awful    Medicine is not helping    Breathing getting harder    Trouble walking or talking    Nose opens wide to breathe       1. Take your rescue medicine NOW (6-8 puffs of albuterol - Ventolin/Pro-Air) for every 20 minutes for up to 1 hour.  2. If your provider has prescribed an oral steroid medicine (Prednisone 20 mg), start taking it NOW.  3. Call your doctor NOW.  4. If you are still in the Red Zone after 20 minutes and you have not reached your doctor:    Take your rescue medicine again (6-8 puffs of albuterol - Ventolin/Pro-Air)  "and    Call 911 or go to the emergency room right away    See your regular doctor within 1 weeks of an Emergency Room or Urgent Care visit for follow-up treatment.        This Asthma Action Plan provides authorization for the administration of medication described in the AAP.  YES    Electronically signed by: Pastor Borges    Annual Reminders:  Meet with Asthma Educator,  Flu Shot in the Fall, Pneumonia Shot  Pharmacy: [facility] [address]          Follow-ups after your visit        Who to contact     Please call your clinic at [phone] to:    Ask questions about your health    Make or cancel appointments    Discuss your medicines    Learn about your test results    Speak to your doctor            Additional Information About Your Visit        Care EveryWhere ID     This is your Care EveryWhere ID. This could be used by other organizations to access your [place] medical records  [id number]        Your Vitals Were     Pulse Temperature Height Pulse Oximetry BMI (Body Mass Index)       131 98.2  F (36.8  C) (Oral) 5' 9\" (175.3 cm) 98% 22.62 kg/m2        Blood Pressure from Last 3 Encounters:   06/11/18 (!) 129/96   09/28/17 148/77   07/27/17 121/83    Weight from Last 3 Encounters:   06/11/18 153 lb 3.2 oz (69.5 kg)   09/28/17 148 lb 6.4 oz (67.3 kg)   07/27/17 156 lb 14.4 oz (71.2 kg)              We Performed the Following     Asthma Action Plan (AAP)     Chlamydia/Gono Amplified ([facility])     Hepatitis B Surface Ab ([facility])     Hepatitis B Surface Ag ([facility])     Hepatitis C Antibody ([facility])     HIV Ag/Ab Screen [place] ([facility])     Syphilis Screen [place] ([facility])          Where to get your medicines      These medications were sent to [facility] [address] [address]     Phone:  [phone]    "  albuterol 108 (90 Base) MCG/ACT Inhaler    BUDESONIDE (INHALATION) 90 MCG/ACT Aepb          Primary Care Provider Office Phone # Fax #    Pastor Borges [phone]       [facility] [place] [address]        Equal Access to Services     JORDYN VICTORIA : Hadii aad ku hadasho Soomaali, waaxda luqadaha, qaybta kaalmada adeegyada, waxay idiin hayaan adeeg khcarolyn laluisn ah. So [facility] [phone].    ATENCIÓN: Si habla español, tiene a mejia disposición servicios gratuitos de asistencia lingüística. [facility] [phone].    We comply with applicable federal civil rights laws and [place] laws. We do not discriminate on the basis of race, color, national origin, age, disability, sex, sexual orientation, or gender identity.            Thank you!     Thank you for choosing [facility]  for your care. Our goal is always to provide you with excellent care. Hearing back from our patients is one way we can continue to improve our services. Please take a few minutes to complete the written survey that you may receive in the mail after your visit with us. Thank you!             Your Updated Medication List - Protect others around you: Learn how to safely use, store and throw away your medicines at www.disposemymeds.org.          This list is accurate as of 6/11/18  2:50 PM.  Always use your most recent med list.                   Brand Name Dispense Instructions for use Diagnosis    albuterol 108 (90 Base) MCG/ACT Inhaler    PROAIR HFA/PROVENTIL HFA/VENTOLIN HFA    2 Inhaler    Inhale 1-2 puffs into the lungs every 6 hours as needed for shortness of breath / dyspnea or wheezing    Mild intermittent asthma without complication       BUDESONIDE (INHALATION) 90 MCG/ACT Aepb     1 each    Inhale 2 puffs into the lungs 2 times daily    Mild intermittent asthma without complication
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
## 2018-06-12 LAB
C TRACH RRNA SPEC QL NAA+PROBE: NEGATIVE
HBV SURFACE AB SER-ACNC: ABNORMAL M[IU]/ML
HBV SURFACE AG SERPL QL IA: NEGATIVE
HCV AB SER QL: NEGATIVE
N GONORRHOEA RRNA SPEC QL NAA+PROBE: NEGATIVE
TREPONEMA ANTIBODY (SYPHILIS): NEGATIVE

## 2018-07-26 ENCOUNTER — COMMUNICATION - HEALTHEAST (OUTPATIENT)
Dept: SCHEDULING | Facility: CLINIC | Age: 38
End: 2018-07-26

## 2018-08-21 ASSESSMENT — ASTHMA QUESTIONNAIRES: ACT_TOTALSCORE: 16

## 2018-09-03 ENCOUNTER — TRANSFERRED RECORDS (OUTPATIENT)
Dept: HEALTH INFORMATION MANAGEMENT | Facility: CLINIC | Age: 38
End: 2018-09-03

## 2018-09-03 ENCOUNTER — COMMUNICATION - HEALTHEAST (OUTPATIENT)
Dept: SCHEDULING | Facility: CLINIC | Age: 38
End: 2018-09-03

## 2018-09-05 ENCOUNTER — TRANSFERRED RECORDS (OUTPATIENT)
Dept: HEALTH INFORMATION MANAGEMENT | Facility: CLINIC | Age: 38
End: 2018-09-05

## 2018-09-06 ENCOUNTER — TELEPHONE (OUTPATIENT)
Dept: FAMILY MEDICINE | Facility: CLINIC | Age: 38
End: 2018-09-06

## 2018-09-07 NOTE — TELEPHONE ENCOUNTER
Call to patient to follow up from ED visit. Patient states that he has been admitted to [facility]. Will watch for discharge

## 2018-09-10 ENCOUNTER — TELEPHONE (OUTPATIENT)
Dept: FAMILY MEDICINE | Facility: CLINIC | Age: 38
End: 2018-09-10

## 2018-09-10 NOTE — TELEPHONE ENCOUNTER
Date of discharge: 9/9/2018  Facility of discharge: [facility]  Patient concerns about condition: No concerns at this time.  Patient concerns about medications: No concerns at this time.  Full med reconciliation will be completed at clinic visit.  Patient concerns about transitioning: No concerns at this time.  Clinic office visit appointment date: 9/13/2018  Dr Borges  Patient reminded to bring all medications (prescription and over-the-counter) to clinic appointment: Yes    Using the date of discharge as day 1, the 30th day post discharge is 10/9/2018.

## 2018-09-13 ENCOUNTER — OFFICE VISIT (OUTPATIENT)
Dept: FAMILY MEDICINE | Facility: CLINIC | Age: 38
End: 2018-09-13
Payer: COMMERCIAL

## 2018-09-13 VITALS
TEMPERATURE: 97.5 F | OXYGEN SATURATION: 98 % | DIASTOLIC BLOOD PRESSURE: 105 MMHG | BODY MASS INDEX: 21.98 KG/M2 | WEIGHT: 145 LBS | HEART RATE: 93 BPM | HEIGHT: 68 IN | SYSTOLIC BLOOD PRESSURE: 135 MMHG | RESPIRATION RATE: 16 BRPM

## 2018-09-13 DIAGNOSIS — K04.7 ABSCESSED TOOTH: ICD-10-CM

## 2018-09-13 DIAGNOSIS — Z11.3 SCREENING EXAMINATION FOR SEXUALLY TRANSMITTED DISEASE: Primary | ICD-10-CM

## 2018-09-13 NOTE — LETTER
September 18, 2018      Augustine Matthews  [address] [place] [place] [address]        Dear Augustine,    The tests show no evidence for sexually transmitted disease.    Please see below for your test results.    Resulted Orders   Chlamydia/Gono Amplified ([facility])   Result Value Ref Range    Chlamydia trac,Amplified Prb Negative Negative    N gonorrhoeae,Amplified Prb Negative Negative    Narrative    Test performed by:  [facility] [address] [place] [address]       If you have any questions, please call the clinic to make an appointment.    Sincerely,    Pastor Borges MD

## 2018-09-13 NOTE — MR AVS SNAPSHOT
"              After Visit Summary   2018    Augustine Matthews    MRN: [id number]           Patient Information     Date Of Birth          1980        Visit Information        Provider Department      2018 11:00 AM Pastor Borges MD [facility]        Today's Diagnoses     Screening examination for sexually transmitted disease    -  1       Follow-ups after your visit        Who to contact     Please call your clinic at [phone] to:    Ask questions about your health    Make or cancel appointments    Discuss your medicines    Learn about your test results    Speak to your doctor            Additional Information About Your Visit        MyChart Information     [employer] is an electronic gateway that provides easy, online access to your medical records. With [employer], you can request a clinic appointment, read your test results, renew a prescription or communicate with your care team.     To sign up for [employer] visit the website at [email or web address]   You will be asked to enter the access code listed below, as well as some personal information. Please follow the directions to create your username and password.     Your access code is: [email or web address]  Expires: 2018 12:12 PM     Your access code will  in 90 days. If you need help or a new code, please contact your [facility] or call [phone] for assistance.        Care EveryWhere ID     This is your Care EveryWhere ID. This could be used by other organizations to access your [place] medical records  [id number]        Your Vitals Were     Pulse Temperature Respirations Height Pulse Oximetry BMI (Body Mass Index)    93 97.5  F (36.4  C) (Oral) 16 5' 8.31\" (173.5 cm) 98% 21.85 kg/m2       Blood Pressure from Last 3 Encounters:   18 (!) 135/105   18 (!) 129/96   17 148/77    Weight from Last 3 Encounters:   18 145 lb (65.8 kg)   18 153 lb 3.2 oz (69.5 kg) "   09/28/17 148 lb 6.4 oz (67.3 kg)              We Performed the Following     Chlamydia/Gono Amplified ([facility])        Primary Care Provider Office Phone # Fax #    Pastor Borges [phone]       [facility] [place] [address]        Equal Access to Services     [facility] CenterBRONWYN : Hadii aad ku hadasho Soomaali, waaxda luqadaha, qaybta kaalmada adeegyada, waxay idiin hayaan adeeg kharash la'aan . So [facility] [phone].    ATENCIÓN: Si habla español, tiene a mejia disposición servicios gratuitos de asistencia lingüística. Kvngame al [phone].    We comply with applicable federal civil rights laws and [place] laws. We do not discriminate on the basis of race, color, national origin, age, disability, sex, sexual orientation, or gender identity.            Thank you!     Thank you for choosing [facility]  for your care. Our goal is always to provide you with excellent care. Hearing back from our patients is one way we can continue to improve our services. Please take a few minutes to complete the written survey that you may receive in the mail after your visit with us. Thank you!             Your Updated Medication List - Protect others around you: Learn how to safely use, store and throw away your medicines at www.disposemymeds.org.          This list is accurate as of 9/13/18 12:12 PM.  Always use your most recent med list.                   Brand Name Dispense Instructions for use Diagnosis    albuterol 108 (90 Base) MCG/ACT inhaler    PROAIR HFA/PROVENTIL HFA/VENTOLIN HFA    2 Inhaler    Inhale 1-2 puffs into the lungs every 6 hours as needed for shortness of breath / dyspnea or wheezing    Mild intermittent asthma without complication       BUDESONIDE (INHALATION) 90 MCG/ACT Aepb     1 each    Inhale 2 puffs into the lungs 2 times daily    Mild intermittent asthma without complication
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
## 2018-09-14 LAB
C TRACH RRNA SPEC QL NAA+PROBE: NEGATIVE
N GONORRHOEA RRNA SPEC QL NAA+PROBE: NEGATIVE

## 2018-09-17 NOTE — PROGRESS NOTES
Please call patient and send results letter  The tests show no evidence for sexually transmitted disease.

## 2018-09-20 ENCOUNTER — TELEPHONE (OUTPATIENT)
Dept: FAMILY MEDICINE | Facility: CLINIC | Age: 38
End: 2018-09-20

## 2018-09-20 NOTE — TELEPHONE ENCOUNTER
[facility] [[email or web address] Family Medicine phone call message- general phone call:    Reason for call: Patient called clinic because he received a VM to call clinic. Checked chart, and wasn't able to verify who called him. Please call and advise.     Return call needed: Yes    OK to leave a message on voice mail? Yes    Primary language: English      needed? No    Call taken on September 20, 2018 at 11:26 AM by funmilayo Giles

## 2018-09-20 NOTE — TELEPHONE ENCOUNTER
Review chart, unable to identify who may have attempted to make contact with Augustine. Informed him his chlamydia and gonorrhea results were negative/ normal. Cheryl FERNANDEZ

## 2018-09-20 NOTE — TELEPHONE ENCOUNTER
TCM APPOINTMENT FOLLOW UP    Language:English    Medication questions: no    Specialist follow up: no    Concerns since last visit: no    Next appointment at [facility]:Yes    Comments: Pt stated that he is doing better, but still in a lot of pain.  Pt wanted to come in to follow up on his tooth abcess.  I was able to schedule the pt to come in on 09/27/18 at 10:00am with .      @[facility]@ Yes   Zehra Cr

## 2018-10-01 ENCOUNTER — OFFICE VISIT (OUTPATIENT)
Dept: FAMILY MEDICINE | Facility: CLINIC | Age: 38
End: 2018-10-01
Payer: COMMERCIAL

## 2018-10-01 VITALS
OXYGEN SATURATION: 100 % | TEMPERATURE: 97.5 F | WEIGHT: 153 LBS | BODY MASS INDEX: 23.19 KG/M2 | SYSTOLIC BLOOD PRESSURE: 132 MMHG | HEART RATE: 114 BPM | HEIGHT: 68 IN | DIASTOLIC BLOOD PRESSURE: 80 MMHG | RESPIRATION RATE: 16 BRPM

## 2018-10-01 DIAGNOSIS — J45.30 MILD PERSISTENT ASTHMA WITHOUT COMPLICATION: ICD-10-CM

## 2018-10-01 DIAGNOSIS — K04.7 ABSCESSED TOOTH: Primary | ICD-10-CM

## 2018-10-01 NOTE — PROGRESS NOTES
HPI:       Augustine Matthews is a 38 year old  male who presents for recheck of tooth abscess.   Patient doing much better.  The facial incision is doing well, no further abscess.  No erythema.  No fever.   Has been advised by the dentist that he may need a root canal done in another tooth that may also be causing a problem. Patient decided not to do that. I have advised that the patient shoujld listen to the dentist and get the root canal as advised. This could cause a serious problem if he does not.  Patient says that he understand.   Discussed asthma action plan. Patient score is 13, suggesting not as well controlled as it could be.              PMHX:   Current Medications:   Current Outpatient Prescriptions   Medication Sig Dispense Refill     albuterol (PROAIR HFA/PROVENTIL HFA/VENTOLIN HFA) 108 (90 Base) MCG/ACT Inhaler Inhale 1-2 puffs into the lungs every 6 hours as needed for shortness of breath / dyspnea or wheezing (Patient not taking: Reported on 10/1/2018) 2 Inhaler 3     BUDESONIDE, INHALATION, 90 MCG/ACT AEPB Inhale 2 puffs into the lungs 2 times daily (Patient not taking: Reported on 10/1/2018) 1 each 11       Existing Problems  Patient Active Problem List   Diagnosis     Mild intermittent asthma without complication       Allergies:  Allergies   Allergen Reactions     No Known Allergies        Previous labs:  Lab Results   Component Value Date    .0 06/14/2012    BUN 8.0 06/14/2012    CO2 27.0 06/14/2012               Review of Systems:    CONSTITUTIONAL: no fatigue, no unexpected change in weight  SKIN: no worrisome rashes, no worrisome moles, no worrisome lesions  EYES: no acute vision problems or changes  ENT: no ear problems, no mouth problems, no throat problems  RESP: as above, having symptoms when he exercises.  CV: no chest pain, no palpitations, no new or worsening peripheral edema  GI: no nausea, no vomiting, no constipation, no diarrhea          Physical Exam:     Vitals:     "10/01/18 1334 10/01/18 1335   BP: 139/90 132/80   Pulse: 114    Resp: 16    Temp: 97.5  F (36.4  C)    TempSrc: Oral    SpO2: 100%    Weight: 153 lb (69.4 kg)    Height: 5' 8\" (172.7 cm)      Body mass index is 23.26 kg/(m^2).    GENERAL:alert, well hydrated, no distress  EYES: Eyes grossly normal to inspection, extraocular movements - intact, and PERRL  HENT: ear canals- normal; TMs- normal; Nose- normal; Mouth- no ulcers, no lesions  NECK: no tenderness, no adenopathy, no asymmetry, no masses, no stiffness; thyroid- normal to palpation  RESP: lungs clear to auscultation - no rales, bibasilar rhonchi, no wheezes  CV: regular rates and rhythm, normal S1 S2, no S3 or S4 and no murmur, no click or rub -               Labs and Procedures     Office Visit on 09/13/2018   Component Date Value Ref Range Status     Chlamydia trac,Amplified Prb 09/13/2018 Negative  Negative Final     N gonorrhoeae,Amplified Prb 09/13/2018 Negative  Negative Final              Assessment and Plan     1  Asthma- will increase controller to two puffs bid.   2. Abscess- this appears to have now resolved. Doing much better.   3. Get root canal as advised by dentist as soon as possible.       Options for treatment and follow-up care were reviewed with the patient and/or guardian. Augustine Matthews and/or guardian engaged in the decision making process and verbalized understanding of the options discussed and agreed with the final plan.    Pastor Borges MD        "

## 2018-10-01 NOTE — PATIENT INSTRUCTIONS
~See Dentist for procedure, root canal as recommended    Your medication list is printed, please keep this with you, it is helpful to bring this current list to any other medical appointments, the emergency room or hospital.    If you had lab testing today and your results are reassuring or normal they will be be mailed to you within 7 days.     If the lab tests need quick action we will call you with the results.   The phone number we will call with results is # [phone] (home) . If this is not the best number please call our clinic and change the number.    If you need any refills please call your pharmacy and they will contact us.    If you have any further concerns or wish to schedule another appointment you must call our office during normal business hours  [phone] (8-5:00 M-F)  If you have urgent medical questions that cannot wait  you may also call [phone] at any time of day.  If you have a medical emergency please call [phone].    Thank you for coming to [facility].

## 2018-10-01 NOTE — MR AVS SNAPSHOT
After Visit Summary   10/1/2018    Augustine Matthews    MRN: [id number]           Patient Information     Date Of Birth          1980        Visit Information        Provider Department      10/1/2018 1:20 PM Pastor Borges MD [facility]        Care Instructions    ~See Dentist for procedure, root canal as recommended    Your medication list is printed, please keep this with you, it is helpful to bring this current list to any other medical appointments, the emergency room or hospital.    If you had lab testing today and your results are reassuring or normal they will be be mailed to you within 7 days.     If the lab tests need quick action we will call you with the results.   The phone number we will call with results is # [phone] (home) . If this is not the best number please call our clinic and change the number.    If you need any refills please call your pharmacy and they will contact us.    If you have any further concerns or wish to schedule another appointment you must call our office during normal business hours  [phone] (8-5:00 M-F)  If you have urgent medical questions that cannot wait  you may also call [phone] at any time of day.  If you have a medical emergency please call [phone].    Thank you for coming to [facility].            Follow-ups after your visit        Who to contact     Please call your clinic at [phone] to:    Ask questions about your health    Make or cancel appointments    Discuss your medicines    Learn about your test results    Speak to your doctor            Additional Information About Your Visit        MyChart Information     [employer] is an electronic gateway that provides easy, online access to your medical records. With [employer], you can request a clinic appointment, read your test results, renew a prescription or communicate with your care team.     To sign up for [employer] visit the website at  [email or web address]   You will be asked to enter the access code listed below, as well as some personal information. Please follow the directions to create your username and password.     Your access code is: [email or web address]  Expires: 2018 12:12 PM     Your access code will  in 90 days. If you need help or a new code, please contact your [facility] or call [phone] for assistance.        Care EveryWhere ID     This is your Care EveryWhere ID. This could be used by other organizations to access your [facility] medical records  [id number]        Your Vitals Were     Pulse Temperature Respirations Height Pulse Oximetry BMI (Body Mass Index)    114 97.5  F (36.4  C) (Oral) 16 5' 8\" (172.7 cm) 100% 23.26 kg/m2       Blood Pressure from Last 3 Encounters:   10/01/18 132/80   18 (!) 135/105   18 (!) 129/96    Weight from Last 3 Encounters:   10/01/18 153 lb (69.4 kg)   18 145 lb (65.8 kg)   18 153 lb 3.2 oz (69.5 kg)              Today, you had the following     No orders found for display       Primary Care Provider Office Phone # Fax #    Pastor Borges [phone]       [facility] [place] [address]        Equal Access to Services     [facility]: Hadii karen salazar hadasho Sorina, waaxda luqadaha, qaybta kaalmada adeegyada, miguel ángel robison. So [facility] [phone].    ATENCIÓN: Si habla español, tiene a mejia disposición servicios gratuitos de asistencia lingüística. Llame al [phone].    We comply with applicable federal civil rights laws and [place] laws. We do not discriminate on the basis of race, color, national origin, age, disability, sex, sexual orientation, or gender identity.            Thank you!     Thank you for choosing [facility]  for your care. Our goal is always to provide you with excellent care. Hearing back from our patients is one way we can continue to " improve our services. Please take a few minutes to complete the written survey that you may receive in the mail after your visit with us. Thank you!             Your Updated Medication List - Protect others around you: Learn how to safely use, store and throw away your medicines at www.disposemymeds.org.          This list is accurate as of 10/1/18  1:45 PM.  Always use your most recent med list.                   Brand Name Dispense Instructions for use Diagnosis    albuterol 108 (90 Base) MCG/ACT inhaler    PROAIR HFA/PROVENTIL HFA/VENTOLIN HFA    2 Inhaler    Inhale 1-2 puffs into the lungs every 6 hours as needed for shortness of breath / dyspnea or wheezing    Mild intermittent asthma without complication       BUDESONIDE (INHALATION) 90 MCG/ACT Aepb     1 each    Inhale 2 puffs into the lungs 2 times daily    Mild intermittent asthma without complication

## 2018-10-02 ASSESSMENT — ASTHMA QUESTIONNAIRES: ACT_TOTALSCORE: 15

## 2019-01-14 ENCOUNTER — OFFICE VISIT (OUTPATIENT)
Dept: FAMILY MEDICINE | Facility: CLINIC | Age: 39
End: 2019-01-14
Payer: COMMERCIAL

## 2019-01-14 VITALS
WEIGHT: 160.2 LBS | TEMPERATURE: 97.8 F | HEART RATE: 98 BPM | BODY MASS INDEX: 23.73 KG/M2 | RESPIRATION RATE: 20 BRPM | OXYGEN SATURATION: 98 % | SYSTOLIC BLOOD PRESSURE: 131 MMHG | HEIGHT: 69 IN | DIASTOLIC BLOOD PRESSURE: 85 MMHG

## 2019-01-14 DIAGNOSIS — Z86.59 HISTORY OF ADHD: ICD-10-CM

## 2019-01-14 ASSESSMENT — MIFFLIN-ST. JEOR: SCORE: 1629.16

## 2019-01-14 NOTE — PROGRESS NOTES
HPI:       Augustine Matthews is a 38 year old  male who presents for evaluation for disability.  Patient is here for evaluation. He  Reports that he was sent a notice that he would qualify for disability if he sent in information from his provider.  Unclear why this was sent.   I have spoken with the patient concerning his current medical condition.  He has a history of ADHD and asthma, but is without other known medical problem.  He informs me that he is not very smart, but he is able to do addition, subtraction, multiplication, and addition without difficulty. No physical limitations of movement, and is able to walk a mile.  Able to climb stair without a problem.   History of a left inguinal hernia.  He reports that sometimes when he is straining that this hurts. Other times, he may notice some groin tenderness as well.  He has asked for the information about the first surgeon whkurt put in the mesh, since he has been told that he may be able to jez to first surgeon or the mesh company.  I have provided him the medical preop exam that we gave in May, 2010 for the first ingunal surgery.   We have discussed that social security disability was a legal definition, and that I could provide a summary of his disabilities, but would be unable to determine whether he was disabled or not.              PMHX:   Current Medications:   Current Outpatient Medications   Medication Sig Dispense Refill     albuterol (PROAIR HFA/PROVENTIL HFA/VENTOLIN HFA) 108 (90 Base) MCG/ACT Inhaler Inhale 1-2 puffs into the lungs every 6 hours as needed for shortness of breath / dyspnea or wheezing 2 Inhaler 3     BUDESONIDE, INHALATION, 90 MCG/ACT AEPB Inhale 2 puffs into the lungs 2 times daily (Patient not taking: Reported on 1/14/2019) 1 each 11       Existing Problems  Patient Active Problem List   Diagnosis     Mild intermittent asthma without complication       Allergies:  Allergies   Allergen Reactions     No Known Allergies   "      Previous labs:  Lab Results   Component Value Date    .0 06/14/2012    BUN 8.0 06/14/2012    CO2 27.0 06/14/2012               Review of Systems:    CONSTITUTIONAL: no fatigue, no unexpected change in weight  SKIN: no worrisome rashes, no worrisome moles, no worrisome lesions  EYES: no acute vision problems or changes  ENT: no ear problems, no mouth problems, no throat problems  RESP: no significant cough, no shortness of breath  CV: no chest pain, no palpitations, no new or worsening peripheral edema  GI: no nausea, no vomiting, no constipation, no diarrhea          Physical Exam:     Vitals:    01/14/19 1520   BP: 131/85   Pulse: 98   Resp: 20   Temp: 97.8  F (36.6  C)   TempSrc: Oral   SpO2: 98%   Weight: 72.7 kg (160 lb 3.2 oz)   Height: 1.74 m (5' 8.5\")     Body mass index is 24 kg/m .    GENERAL:alert, well hydrated, no distress  EYES: Eyes grossly normal to inspection, extraocular movements - intact, and PERRL  HENT: ear canals- normal; TMs- normal; Nose- normal; Mouth- no ulcers, no lesions  NECK: no tenderness, no adenopathy, no asymmetry, no masses, no stiffness; thyroid- normal to palpation  RESP: lungs clear to auscultation - no rales, no rhonchi, no wheezes  CV: regular rates and rhythm, normal S1 S2, no S3 or S4 and no murmur, no click or rub -  : left sided inguinal scar is well healed. No evidence of a recurrent hernia.  Subjective discomfort with pressure over the mesh.                Labs and Procedures     Office Visit on 09/13/2018   Component Date Value Ref Range Status     Chlamydia trac,Amplified Prb 09/13/2018 Negative  Negative Final     N gonorrhoeae,Amplified Prb 09/13/2018 Negative  Negative Final              Assessment and Plan     1.Have provided patient with information about the first surgery for inguinal hernia.   2. Have provided patient with a list of his medical problems. Unclear how this will alter his eligibility for social security disability that the patient is " seeking.       Options for treatment and follow-up care were reviewed with the patient and/or guardian. Augustine Matthews and/or guardian engaged in the decision making process and verbalized understanding of the options discussed and agreed with the final plan.    Pastor Borges MD

## 2019-01-14 NOTE — PATIENT INSTRUCTIONS
Owner: Carolynn Mccormack  Status: Final  Encounter: 4 May 2010  Type: FM Adult CPE     Reason For Visit   Patient presents for pre-op, hernia. Surgery date is 5-7-10.  [facility]  [address] [place] [address]  [phone]     Allergy List reviewed: CurrentUpdated by adding.  Immunizations reviewed:  Up to dateNeeds H1n1 Today and  Tdap per patient 4-15-10. If  adult, last Td  Medication list reviewed with patient and was up to date. D/C Tramadol per patient stated it didn't help.  Allergies   No Known Drug Allergy.  Smoking Assessment   No secondhand cigarette smoke exposure.  No tobacco use.  Vital Signs   Recorded by ineserChyna on 04 May 2010 10:08 AM  BP:114/78,  RUE,  Sitting,   HR: 83 b/min,   Temp: 97.6 F,   Weight: 164.2 lb.  Current Meds   Tramadol HCl 50 MG Tablet;TAKE 1 TABLET EVERY 12 HOURS AS NEEDED.; Rx.  Active Problems   Joint Pain, Localized In The Knee; Right (719.46); suspect patellar instability  Right Inguinal Hernia (550.90).  Adult PE   Pre-Operative Exam  Surgery Date: 5/6/10  Location: [facility]  Pre-op diagnosis: right inguinal hernia  Chief Complaint: right inguinal hernia  HPI: 31 yo male with right inguinal hernia, usually reducible.  Had one episode where he presented to the ER 2/2 bulge that was painful and that he could not reduce.    Procedure: laparoscopic hernia repair     Surgeon: [facility]     Review of Systems  Constitutional: no fevers, night sweats or unintentional weight change  Eyes: no vision change, diplopia or red eyes  Ears, Nose, Mouth, Throat: no tinnitus or hearing change, no epistaxis or nasal discharge, no oral lesions, throat clear  Cardiovascular: no chest pain, palpitations, or pain with walking, no orthopnea or PND  Respiratory: no dyspnea, cough, shortness of breath or wheezing; does use albuterol inhaler ~3x/week   GI: no nausea, vomiting, diarrhea or constipation, no abdominal pain  : no change in urine, no dysuria or hematuria, no  sexual dysfunction  Musculoskeletal: recent right knee injury, otherwise no joint or muscle pain or swelling  Integumentary: no concerning lesions or moles  Neuro: no loss of strength or sensation, no numbness or tingling, no tremor, no dizziness, no headache  Endo: no polyuria or polydipsia, no temperature intolerance  Heme/Lymph: no concerning bumps, no bleeding problems  Allergy: ? environmental allergies  Psych: no depression or anxiety, no sleep problems        Past medical Hx asthma - mild, intermittent             CAD                                      DM                                                                      Cancer               History of excessive bleeding  No           Anesthesia Complications  No     Family Hx : asthma           FHx of excessive bleeding?  No           FHx of Anesthesia Complications ? No     Aspirin use within 10 days of surgery? None     Social Hx          Abuse History Negative     Risk behaviors & healthy habits  Tobacco Use/Smoking None  Illicit Drug Use None  ETOH  None     Little interest or pleasure in doing things.  0 = Not at all   Feeling down, depressed, or hopeless.  0 = Not at all      IMMUNIZATIONS:Reviewed Immunization Record Today  Last Td Date:4/15/10    Pneumovax Needed? No  Flu shot Needed?  Will give today     ACC/AHA classification of surgical procedure risk  Low Risk   Robert's Simple Cardiac Risk Index : 1 point for present 0 points for absent  High risk surgery                                0   Coronary Artery Disease                   0   Congestive Heart Failure                      0   History of Cerebrovascular disease   0   Insulin treatment for DM                   0   Preoperative serum Creatinine >2.0 0                                            Total Points 0  Score interpretation-(risk of complications)  0 pt =class I (0.4%)            EXAMINATION:     Physical Exam  Constitutional: no distress, comfortable, pleasant   Eyes: anicteric,  normal extra-ocular movements  Ears, Nose and Throat: tympanic membranes clear, nose clear and free of lesions, throat clear.   Neck: supple with full range of motion, no thyromegaly   Cardiovascular: regular rate and rhythm, no murmurs, rubs or gallops, peripheral pulses full and symmetric   Respiratory: clear to auscultation, no wheezes or crackles, normal breath sounds  Gastrointestinal: positive bowel sounds, nontender, no hepatosplenomegaly, no masses  Musculoskeletal: full range of motion, no edema; right leg is braced   Skin:no concerning lesions, no jaundice   Breast: no lumps, no nipple discharge  Neurological: cranial nerves intact, normal strength and sensation, reflexes at patella and biceps normal, normal gait, no tremor  Psychological: appropriate mood  Lymphatic: no cervical, axillary, or inguinal lymphadenopathy     Minimum Recommended Testing  Hemoglobin - 17.5      Assessment/Plan: Patient is medically cleared for surgery. Proceed with surgery as scheduled.   .  Results   PV Hemoglobin   04 May 2010 10:47 AM  -   Hemoglobin: 17.5 g/dl.  Attending Note   Patient reviewed and discussed with resident.  Agree with Plan of Care.   Supervising Physician: Dr. Vogel.    Signed By: Carolynn Mccormack MD,Resident; 05/05/2010 10:09 AM CST.  Signed By: Alice Vogel MD; 05/11/2010 5:45 PM CST.

## 2019-02-24 ENCOUNTER — COMMUNICATION - HEALTHEAST (OUTPATIENT)
Dept: SCHEDULING | Facility: CLINIC | Age: 39
End: 2019-02-24

## 2019-03-14 ENCOUNTER — OFFICE VISIT (OUTPATIENT)
Dept: FAMILY MEDICINE | Facility: CLINIC | Age: 39
End: 2019-03-14
Payer: COMMERCIAL

## 2019-03-14 VITALS
DIASTOLIC BLOOD PRESSURE: 84 MMHG | BODY MASS INDEX: 24.51 KG/M2 | OXYGEN SATURATION: 98 % | RESPIRATION RATE: 18 BRPM | WEIGHT: 163.6 LBS | TEMPERATURE: 97.7 F | HEART RATE: 93 BPM | SYSTOLIC BLOOD PRESSURE: 143 MMHG

## 2019-03-14 DIAGNOSIS — J45.20 MILD INTERMITTENT ASTHMA WITHOUT COMPLICATION: ICD-10-CM

## 2019-03-14 DIAGNOSIS — L20.82 FLEXURAL ECZEMA: Primary | ICD-10-CM

## 2019-03-14 RX ORDER — BENZOCAINE/MENTHOL 6 MG-10 MG
LOZENGE MUCOUS MEMBRANE 2 TIMES DAILY
Qty: 30 G | Refills: 1 | Status: SHIPPED | OUTPATIENT
Start: 2019-03-14 | End: 2019-06-07

## 2019-03-14 RX ORDER — ALBUTEROL SULFATE 90 UG/1
1-2 AEROSOL, METERED RESPIRATORY (INHALATION) EVERY 6 HOURS PRN
Qty: 8 G | Refills: 11 | Status: SHIPPED | OUTPATIENT
Start: 2019-03-14 | End: 2019-06-07

## 2019-03-14 NOTE — PROGRESS NOTES
HPI:       Augustine Matthews is a 39 year old  male who presents for onset of rash at elbows, lower abdomen, and ankles.   Patient has recently changed soaps, and taking hot showers, and noted dry skin. Has now developed a mild pruritic erythematous rash at the antecubital fossa bilaterally and lower abdomen. No fever, no chills.   Would also like his asthma meds refilled.  Asthma has been stable, but he lost one of his meds.              PMHX:   Current Medications:   Current Outpatient Medications   Medication Sig Dispense Refill     albuterol (PROAIR HFA/PROVENTIL HFA/VENTOLIN HFA) 108 (90 Base) MCG/ACT Inhaler Inhale 1-2 puffs into the lungs every 6 hours as needed for shortness of breath / dyspnea or wheezing 2 Inhaler 3     BUDESONIDE, INHALATION, 90 MCG/ACT AEPB Inhale 2 puffs into the lungs 2 times daily (Patient not taking: Reported on 1/14/2019) 1 each 11       Existing Problems  Patient Active Problem List   Diagnosis     Mild intermittent asthma without complication     History of ADHD       Allergies:  Allergies   Allergen Reactions     No Known Allergies        Previous labs:  Lab Results   Component Value Date    .0 06/14/2012    BUN 8.0 06/14/2012    CO2 27.0 06/14/2012               Review of Systems:    CONSTITUTIONAL: no fatigue, no unexpected change in weight  SKIN: as above, no worrisome moles  EYES: no acute vision problems or changes  ENT: no ear problems, no mouth problems, no throat problems  RESP: no significant cough, no shortness of breath  CV: no chest pain, no palpitations, no new or worsening peripheral edema  GI: no nausea, no vomiting, no constipation, no diarrhea          Physical Exam:     Vitals:    03/14/19 0955   BP: 143/84   Pulse: 93   Resp: 18   Temp: 97.7  F (36.5  C)   TempSrc: Oral   SpO2: 98%   Weight: 74.2 kg (163 lb 9.6 oz)     Body mass index is 24.51 kg/m .    GENERAL:alert, well hydrated, no distress  EYES: Eyes grossly normal to inspection, extraocular  movements - intact, and PERRL  HENT: ear canals- normal; TMs- normal; Nose- normal; Mouth- no ulcers, no lesions  NECK: no tenderness, no adenopathy, no asymmetry, no masses, no stiffness; thyroid- normal to palpation  RESP: lungs clear to auscultation - no rales, no rhonchi, no wheezes  CV: regular rates and rhythm, normal S1 S2, no S3 or S4 and no murmur, no click or rub -  DERM: mild erythematous irritation antecubital fossa bilaterally,no excoriations, no raised lesions. Mild irritation at the belt line, and mild erythema at the lower right ankle.              Labs and Procedures     Office Visit on 09/13/2018   Component Date Value Ref Range Status     Chlamydia trac,Amplified Prb 09/13/2018 Negative  Negative Final     N gonorrhoeae,Amplified Prb 09/13/2018 Negative  Negative Final              Assessment and Plan     1.Mild eczema onset, etiology uncertain. Possibly the change in soap, or the hot showers, or dry air.   2. Hydrocortisone 1% 30 g x 2. Apply to affected areas bid as needed.  Moisturizer for longer term control.   3. Asthma meds refilled.        Options for treatment and follow-up care were reviewed with the patient and/or guardian. Augustine Matthews and/or guardian engaged in the decision making process and verbalized understanding of the options discussed and agreed with the final plan.    Pastor Borges MD

## 2019-05-13 ENCOUNTER — TRANSFERRED RECORDS (OUTPATIENT)
Dept: HEALTH INFORMATION MANAGEMENT | Facility: CLINIC | Age: 39
End: 2019-05-13

## 2019-05-13 ENCOUNTER — RECORDS - HEALTHEAST (OUTPATIENT)
Dept: ADMINISTRATIVE | Facility: OTHER | Age: 39
End: 2019-05-13

## 2019-06-07 ENCOUNTER — OFFICE VISIT (OUTPATIENT)
Dept: FAMILY MEDICINE | Facility: CLINIC | Age: 39
End: 2019-06-07
Payer: COMMERCIAL

## 2019-06-07 VITALS
HEART RATE: 108 BPM | HEIGHT: 69 IN | DIASTOLIC BLOOD PRESSURE: 95 MMHG | SYSTOLIC BLOOD PRESSURE: 135 MMHG | TEMPERATURE: 97.7 F | WEIGHT: 159.8 LBS | RESPIRATION RATE: 97 BRPM | BODY MASS INDEX: 23.67 KG/M2

## 2019-06-07 DIAGNOSIS — M79.10 MUSCLE PAIN: Primary | ICD-10-CM

## 2019-06-07 DIAGNOSIS — J45.20 MILD INTERMITTENT ASTHMA WITHOUT COMPLICATION: ICD-10-CM

## 2019-06-07 DIAGNOSIS — F41.9 ANXIETY: ICD-10-CM

## 2019-06-07 DIAGNOSIS — S82.016D: ICD-10-CM

## 2019-06-07 RX ORDER — CYCLOBENZAPRINE HCL 10 MG
10 TABLET ORAL 2 TIMES DAILY PRN
Qty: 60 TABLET | Refills: 0 | Status: SHIPPED | OUTPATIENT
Start: 2019-06-07 | End: 2020-03-25

## 2019-06-07 RX ORDER — ALBUTEROL SULFATE 90 UG/1
1-2 AEROSOL, METERED RESPIRATORY (INHALATION) EVERY 6 HOURS PRN
Qty: 8 G | Refills: 11 | Status: SHIPPED | OUTPATIENT
Start: 2019-06-07 | End: 2020-03-25

## 2019-06-07 RX ORDER — CYCLOBENZAPRINE HCL 10 MG
10 TABLET ORAL 2 TIMES DAILY PRN
Refills: 0 | COMMUNITY
Start: 2019-05-05 | End: 2019-06-07

## 2019-06-07 ASSESSMENT — PATIENT HEALTH QUESTIONNAIRE - PHQ9: SUM OF ALL RESPONSES TO PHQ QUESTIONS 1-9: 3

## 2019-06-07 ASSESSMENT — MIFFLIN-ST. JEOR: SCORE: 1630.23
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
## 2019-06-08 ASSESSMENT — ASTHMA QUESTIONNAIRES: ACT_TOTALSCORE: 13

## 2019-07-31 ENCOUNTER — TELEPHONE (OUTPATIENT)
Dept: FAMILY MEDICINE | Facility: CLINIC | Age: 39
End: 2019-07-31

## 2019-07-31 NOTE — TELEPHONE ENCOUNTER
Last seen June 2019, if would like or need to confirm cleared for travel or activity may require an office visit. If experiencing some symptoms related to asthma, would advise and recommend he be seen prior to travel. Called, no answer, no voicemail to leave message. Will try another attempt at later time.  Cheryl FERNANDEZ

## 2019-07-31 NOTE — TELEPHONE ENCOUNTER
[facility] phone call message- general phone call:    Reason for call: Patient states he had some concerns before with his lungs. He states he is planning a trip with his friends to [place] and he would like to verify if it's ok to go. Please call and advise.     Return call needed: Yes    OK to leave a message on voice mail? Yes    Primary language: English      needed? No    Call taken on July 31, 2019 at 4:02 PM by Adriane Albarran

## 2019-07-31 NOTE — TELEPHONE ENCOUNTER
Patient returned call, notified him of Netta's message. Patient decided to schedule an appointment with pcp.

## 2019-08-07 ENCOUNTER — OFFICE VISIT (OUTPATIENT)
Dept: FAMILY MEDICINE | Facility: CLINIC | Age: 39
End: 2019-08-07
Payer: MEDICAID

## 2019-08-07 VITALS
TEMPERATURE: 97.8 F | DIASTOLIC BLOOD PRESSURE: 88 MMHG | SYSTOLIC BLOOD PRESSURE: 129 MMHG | WEIGHT: 164 LBS | RESPIRATION RATE: 20 BRPM | OXYGEN SATURATION: 93 % | HEART RATE: 107 BPM | BODY MASS INDEX: 24.22 KG/M2

## 2019-08-07 DIAGNOSIS — G89.18 POSTOPERATIVE PAIN: ICD-10-CM

## 2019-08-07 DIAGNOSIS — J45.40 MODERATE PERSISTENT ASTHMA WITHOUT COMPLICATION: Primary | ICD-10-CM

## 2019-08-07 RX ORDER — LIDOCAINE HYDROCHLORIDE 20 MG/ML
JELLY TOPICAL 3 TIMES DAILY
Qty: 30 ML | Refills: 11 | Status: SHIPPED | OUTPATIENT
Start: 2019-08-07 | End: 2022-05-10

## 2019-08-07 ASSESSMENT — ASTHMA QUESTIONNAIRES
QUESTION_4 LAST FOUR WEEKS HOW OFTEN HAVE YOU USED YOUR RESCUE INHALER OR NEBULIZER MEDICATION (SUCH AS ALBUTEROL): TWO OR THREE TIMES PER WEEK
QUESTION_5 LAST FOUR WEEKS HOW WOULD YOU RATE YOUR ASTHMA CONTROL: SOMEWHAT CONTROLLED
ACT_TOTALSCORE: 14
QUESTION_3 LAST FOUR WEEKS HOW OFTEN DID YOUR ASTHMA SYMPTOMS (WHEEZING, COUGHING, SHORTNESS OF BREATH, CHEST TIGHTNESS OR PAIN) WAKE YOU UP AT NIGHT OR EARLIER THAN USUAL IN THE MORNING: TWO OR THREE NIGHTS A WEEK
QUESTION_2 LAST FOUR WEEKS HOW OFTEN HAVE YOU HAD SHORTNESS OF BREATH: THREE TO SIX TIMES A WEEK
QUESTION_1 LAST FOUR WEEKS HOW MUCH OF THE TIME DID YOUR ASTHMA KEEP YOU FROM GETTING AS MUCH DONE AT WORK, SCHOOL OR AT HOME: SOME OF THE TIME

## 2019-08-07 NOTE — PROGRESS NOTES
Assessment and Plan     1. Moderate persistent asthma without complication  Uncontrolled. Restart his pulmicort and recheck in 1 month. May consider PFTs at that time. Discussed treatment of asthma with maintenance and rescue and goals of asthma care.    2. Postoperative pain  Try some topical lidocaine. Patient understands that he might have to pay for it.  - lidocaine (XYLOCAINE) 2 % external gel; Apply topically 3 times daily  Dispense: 30 mL; Refill: 11    Discussed spontaneous pneumothoracies. I don't have any reason why he can't go on vacation.    Overall, I counseled and coordinated care on the above issues for greater than 50% of the 30 minute face-to-face visit.    Options for treatment and follow-up care were reviewed with the patient and/or guardian. Augustine Matthews and/or guardian engaged in the decision making process and verbalized understanding of the options discussed and agreed with the final plan. I answered all of their questions.    Andre Daley III, MD, FAAFP  [facility]  08/07/19 12:06 PM           HPI:       Augustine Matthews is a 39 year old  male  Patient presents with:  Travel Clinic: Lung problem  Refill Request  Asthma    Has not been doing a good job of taking his pulmicort. Fell of the wagon for several weeks. Probably has only been taking it for a week or two and missed 2-3 days a week. Wakes in the night most weeks with SOB. Used his albuterol this AM. Has not had the same level of activity due to his SOB. Now lives on the 3rd floor and gets SOB climbing the stairs. Also doesn't have central air and wonders if that is making his asthma worse.    ACT Total Scores 10/1/2018 6/7/2019 8/7/2019   ACT TOTAL SCORE (Goal Greater than or Equal to 20) 15 13 14   In the past 12 months, how many times did you visit the emergency room for your asthma without being admitted to the hospital? 0 0 0   In the past 12 months, how many times were you hospitalized overnight  because of your asthma? 0 0 0     History of spontaneous pneumothorax x 2 and pleurodesis. Doesn't remember when this was done.    Goes to [place] in 15 days.    Is suing a surgeon over his hernia repair. Has shallow pains in his groin for about 30min every once and a while. Not predictable. Hasn't tried any creams for it.         PMHX:     Patient Active Problem List   Diagnosis     Moderate persistent asthma without complication     History of ADHD       Current Outpatient Medications   Medication Sig Dispense Refill     albuterol (PROAIR HFA/PROVENTIL HFA/VENTOLIN HFA) 108 (90 Base) MCG/ACT inhaler Inhale 1-2 puffs into the lungs every 6 hours as needed for shortness of breath / dyspnea or wheezing 8 g 11     budesonide (PULMICORT FLEXHALER) 90 MCG/ACT inhaler Inhale 2 puffs into the lungs 2 times daily 1 each 11     cyclobenzaprine (FLEXERIL) 10 MG tablet Take 1 tablet (10 mg) by mouth 2 times daily as needed for muscle spasms 60 tablet 0     lidocaine (XYLOCAINE) 2 % external gel Apply topically 3 times daily 30 mL 11       Social History     Socioeconomic History     Marital status: Single     Spouse name: Not on file     Number of children: Not on file     Years of education: Not on file     Highest education level: Not on file   Occupational History     Not on file   Social Needs     Financial resource strain: Not on file     Food insecurity:     Worry: Not on file     Inability: Not on file     Transportation needs:     Medical: Not on file     Non-medical: Not on file   Tobacco Use     Smoking status: Former Smoker     Smokeless tobacco: Never Used     Tobacco comment: exposure to smokers   Substance and Sexual Activity     Alcohol use: No     Drug use: No     Sexual activity: Not on file   Lifestyle     Physical activity:     Days per week: Not on file     Minutes per session: Not on file     Stress: Not on file   Relationships     Social connections:     Talks on phone: Not on file     Gets  together: Not on file     Attends Shinto service: Not on file     Active member of club or organization: Not on file     Attends meetings of clubs or organizations: Not on file     Relationship status: Not on file     Intimate partner violence:     Fear of current or ex partner: Not on file     Emotionally abused: Not on file     Physically abused: Not on file     Forced sexual activity: Not on file   Other Topics Concern     Parent/sibling w/ CABG, MI or angioplasty before 65F 55M? Not Asked   Social History Narrative     Not on file       Allergies   Allergen Reactions     No Known Allergies        No results found for this or any previous visit (from the past 24 hour(s)).         Review of Systems:     Review of Systems   All other systems reviewed and are negative.           Physical Exam:     Vitals:    08/07/19 1133   BP: 129/88   Pulse: 107   Resp: 20   Temp: 97.8  F (36.6  C)   TempSrc: Oral   SpO2: 93%   Weight: 74.4 kg (164 lb)     Body mass index is 24.22 kg/m .    Physical Exam   Constitutional: He is oriented to person, place, and time. He appears well-developed and well-nourished.  Non-toxic appearance. He does not have a sickly appearance. No distress.   Cardiovascular: Normal rate, regular rhythm, normal heart sounds and intact distal pulses. Exam reveals no gallop and no friction rub.   No murmur heard.  Pulmonary/Chest: Effort normal and breath sounds normal. No stridor. No respiratory distress. He has no wheezes. He has no rales.   Neurological: He is alert and oriented to person, place, and time.   Skin: He is not diaphoretic.   Nursing note and vitals reviewed.

## 2019-08-08 ASSESSMENT — ASTHMA QUESTIONNAIRES: ACT_TOTALSCORE: 14

## 2020-01-02 ENCOUNTER — COMMUNICATION - HEALTHEAST (OUTPATIENT)
Dept: SCHEDULING | Facility: CLINIC | Age: 40
End: 2020-01-02

## 2020-03-25 DIAGNOSIS — J45.20 MILD INTERMITTENT ASTHMA WITHOUT COMPLICATION: ICD-10-CM

## 2020-03-25 DIAGNOSIS — M79.10 MUSCLE PAIN: ICD-10-CM

## 2020-04-02 RX ORDER — ALBUTEROL SULFATE 90 UG/1
1-2 AEROSOL, METERED RESPIRATORY (INHALATION) EVERY 6 HOURS PRN
Qty: 8 G | Refills: 0 | Status: SHIPPED | OUTPATIENT
Start: 2020-04-02 | End: 2020-10-22

## 2020-04-02 RX ORDER — CYCLOBENZAPRINE HCL 10 MG
10 TABLET ORAL 2 TIMES DAILY PRN
Qty: 60 TABLET | Refills: 0 | Status: SHIPPED | OUTPATIENT
Start: 2020-04-02 | End: 2021-04-29

## 2020-04-02 NOTE — TELEPHONE ENCOUNTER
Due for recheck. Will try to set up phone visit.    Andre Daley III, MD, FAAFP  [facility]  04/02/20 9:36 AM

## 2020-10-18 DIAGNOSIS — J45.20 MILD INTERMITTENT ASTHMA WITHOUT COMPLICATION: ICD-10-CM

## 2020-10-19 NOTE — TELEPHONE ENCOUNTER
Called, no answer, busy phone. Patient needs to schedule appt to refill medication since it's been over a year.

## 2020-10-22 RX ORDER — ALBUTEROL SULFATE 90 UG/1
AEROSOL, METERED RESPIRATORY (INHALATION)
Qty: 8.5 G | Refills: 0 | Status: SHIPPED | OUTPATIENT
Start: 2020-10-22 | End: 2020-11-25

## 2020-11-24 DIAGNOSIS — J45.20 MILD INTERMITTENT ASTHMA WITHOUT COMPLICATION: ICD-10-CM

## 2020-11-25 RX ORDER — ALBUTEROL SULFATE 90 UG/1
AEROSOL, METERED RESPIRATORY (INHALATION)
Qty: 8.5 G | Refills: 0 | Status: SHIPPED | OUTPATIENT
Start: 2020-11-25 | End: 2021-03-02

## 2021-03-02 ENCOUNTER — VIRTUAL VISIT (OUTPATIENT)
Dept: FAMILY MEDICINE | Facility: CLINIC | Age: 41
End: 2021-03-02
Payer: COMMERCIAL

## 2021-03-02 VITALS — HEIGHT: 69 IN | WEIGHT: 151 LBS | BODY MASS INDEX: 22.36 KG/M2

## 2021-03-02 DIAGNOSIS — J45.20 MILD INTERMITTENT ASTHMA WITHOUT COMPLICATION: ICD-10-CM

## 2021-03-02 PROCEDURE — 99213 OFFICE O/P EST LOW 20 MIN: CPT | Mod: TEL | Performed by: FAMILY MEDICINE

## 2021-03-02 RX ORDER — IBUPROFEN 800 MG/1
TABLET, FILM COATED ORAL
COMMUNITY
Start: 2020-09-24 | End: 2022-05-10

## 2021-03-02 RX ORDER — ALBUTEROL SULFATE 0.83 MG/ML
2.5 SOLUTION RESPIRATORY (INHALATION) EVERY 6 HOURS PRN
Qty: 90 ML | Refills: 1 | Status: SHIPPED | OUTPATIENT
Start: 2021-03-02 | End: 2021-06-08

## 2021-03-02 RX ORDER — ALBUTEROL SULFATE 90 UG/1
AEROSOL, METERED RESPIRATORY (INHALATION)
Qty: 8.5 G | Refills: 0 | Status: SHIPPED | OUTPATIENT
Start: 2021-03-02 | End: 2021-03-24

## 2021-03-02 ASSESSMENT — ASTHMA QUESTIONNAIRES
QUESTION_1 LAST FOUR WEEKS HOW MUCH OF THE TIME DID YOUR ASTHMA KEEP YOU FROM GETTING AS MUCH DONE AT WORK, SCHOOL OR AT HOME: MOST OF THE TIME
QUESTION_2 LAST FOUR WEEKS HOW OFTEN HAVE YOU HAD SHORTNESS OF BREATH: MORE THAN ONCE A DAY
QUESTION_3 LAST FOUR WEEKS HOW OFTEN DID YOUR ASTHMA SYMPTOMS (WHEEZING, COUGHING, SHORTNESS OF BREATH, CHEST TIGHTNESS OR PAIN) WAKE YOU UP AT NIGHT OR EARLIER THAN USUAL IN THE MORNING: FOUR OR MORE NIGHTS A WEEK
QUESTION_4 LAST FOUR WEEKS HOW OFTEN HAVE YOU USED YOUR RESCUE INHALER OR NEBULIZER MEDICATION (SUCH AS ALBUTEROL): ONE OR TWO TIMES PER DAY
ACT_TOTALSCORE: 9
QUESTION_5 LAST FOUR WEEKS HOW WOULD YOU RATE YOUR ASTHMA CONTROL: SOMEWHAT CONTROLLED

## 2021-03-02 ASSESSMENT — MIFFLIN-ST. JEOR: SCORE: 1580.31

## 2021-03-02 NOTE — PROGRESS NOTES
"Family Medicine Telephone Visit Note    Attempted to call no answer  Zeynep Jamison MD      Chief Complaint   Patient presents with     Asthma     f/u with asthma for future med refills     Medication Reconciliation     complete            HPI   Patients name: Augustine  Appointment start time:  5:24 PM    1. Asthma:  -using pulmicort       Current Outpatient Medications   Medication Sig Dispense Refill     albuterol (PROAIR HFA/PROVENTIL HFA/VENTOLIN HFA) 108 (90 Base) MCG/ACT inhaler INHALE 1 TO 2 PUFFS INTO THE LUNGS EVERY 6 HOURS AS NEEDED FOR SHORTNESS OF BREATH OR DIFFICULT BREATHING OR WHEEZING 8.5 g 0     budesonide (PULMICORT FLEXHALER) 90 MCG/ACT inhaler Inhale 2 puffs into the lungs 2 times daily 1 each 0     cyclobenzaprine (FLEXERIL) 10 MG tablet Take 1 tablet (10 mg) by mouth 2 times daily as needed for muscle spasms 60 tablet 0     ibuprofen (ADVIL/MOTRIN) 800 MG tablet TK ONE T PO Q 6 TO 8 H PRN       lidocaine (XYLOCAINE) 2 % external gel Apply topically 3 times daily 30 mL 11     Allergies   Allergen Reactions     No Known Allergies               Review of Systems:     Denies cough         Physical Exam:     Ht 1.753 m (5' 9\")   Wt 68.5 kg (151 lb)   BMI 22.30 kg/m    Estimated body mass index is 22.3 kg/m  as calculated from the following:    Height as of this encounter: 1.753 m (5' 9\").    Weight as of this encounter: 68.5 kg (151 lb).    Exam:  Constitutional: healthy, alert and no distress  Psychiatric: mentation appears normal and affect normal/bright    ACT 9            Assessment and Plan   1. Mild intermittent asthma without complication  Patient not well controlled: ACT score 9  Will increase pulmicort, encourage consistent use, ok for prn nebulizer use, discussed how it is not at pharmacy but will get a phone call    -encouraged flu vaccine.  - budesonide (PULMICORT FLEXHALER) 180 MCG/ACT inhaler; Inhale 1 puff into the lungs 2 times daily  Dispense: 1 each; Refill: 11  - albuterol " (PROVENTIL) (2.5 MG/3ML) 0.083% neb solution; Take 1 vial (2.5 mg) by nebulization every 6 hours as needed for shortness of breath / dyspnea or wheezing  Dispense: 90 mL; Refill: 1  - albuterol (PROAIR HFA/PROVENTIL HFA/VENTOLIN HFA) 108 (90 Base) MCG/ACT inhaler; INHALE 1 TO 2 PUFFS INTO THE LUNGS EVERY 6 HOURS AS NEEDED FOR SHORTNESS OF BREATH OR DIFFICULT BREATHING OR WHEEZING  Dispense: 8.5 g; Refill: 0  - Nebulizer and Supplies Order for DME - ONLY FOR DME    Refilled medications that would be required in the next 3 months.     After Visit Information:  Will print and mail AVS     No follow-ups on file.    Appointment end time: 5:40 PM  This is a telephone visit that took 16 minutes.      Clinician location:  [facility]     Zeynep Jamison MD

## 2021-03-03 ASSESSMENT — ASTHMA QUESTIONNAIRES: ACT_TOTALSCORE: 9

## 2021-03-24 DIAGNOSIS — J45.20 MILD INTERMITTENT ASTHMA WITHOUT COMPLICATION: ICD-10-CM

## 2021-03-26 RX ORDER — ALBUTEROL SULFATE 90 UG/1
AEROSOL, METERED RESPIRATORY (INHALATION)
Qty: 8.5 G | Refills: 3 | Status: SHIPPED | OUTPATIENT
Start: 2021-03-26 | End: 2022-04-21

## 2021-04-28 DIAGNOSIS — J45.20 MILD INTERMITTENT ASTHMA WITHOUT COMPLICATION: ICD-10-CM

## 2021-04-28 DIAGNOSIS — M79.10 MUSCLE PAIN: ICD-10-CM

## 2021-04-29 RX ORDER — BUDESONIDE 90 UG/1
AEROSOL, POWDER RESPIRATORY (INHALATION)
Qty: 1 EACH | Refills: 11 | Status: SHIPPED | OUTPATIENT
Start: 2021-04-29 | End: 2022-05-10

## 2021-04-29 RX ORDER — CYCLOBENZAPRINE HCL 10 MG
TABLET ORAL
Qty: 60 TABLET | Refills: 1 | Status: SHIPPED | OUTPATIENT
Start: 2021-04-29 | End: 2022-05-10

## 2021-05-30 ENCOUNTER — RECORDS - HEALTHEAST (OUTPATIENT)
Dept: ADMINISTRATIVE | Facility: CLINIC | Age: 41
End: 2021-05-30

## 2021-05-31 VITALS — HEIGHT: 69 IN | BODY MASS INDEX: 23.55 KG/M2 | WEIGHT: 159 LBS

## 2021-06-01 ENCOUNTER — RECORDS - HEALTHEAST (OUTPATIENT)
Dept: ADMINISTRATIVE | Facility: CLINIC | Age: 41
End: 2021-06-01

## 2021-06-02 ENCOUNTER — RECORDS - HEALTHEAST (OUTPATIENT)
Dept: ADMINISTRATIVE | Facility: CLINIC | Age: 41
End: 2021-06-02

## 2021-06-04 NOTE — TELEPHONE ENCOUNTER
"[place] [facility].   Cramp in the right lower side of his stomach, above the hip bone in the front. Cramping really tight while raising his leg to put on his sock due to the pain, for the last few days. This lasts for a minute or two. Pain=\"10\" when cramping. During the day the pain = \"4-5\". Located to the right of his umbilicus. Tender when he presses on the area. Getting worse.   No fever or chills noted. His abdomen is a little distended on that side. Bowel movements, real bad diarrhea all morning today. Now he states there is a bulging area on his abdomen. No bleeding noted.     Reason for Disposition    [1] MILD-MODERATE pain AND [2] constant AND [3] present > 2 hours    Protocols used: ABDOMINAL PAIN - MALE-A-AH    Triaged to a disposition of See Physician within 4 hrs (or PCP triage).  Advised to call oncall PCP now, or go to the ER. Patient states he will go to the ER now.     Tameka Morrell RN Triage Nurse Advisor  "

## 2021-06-12 NOTE — PROGRESS NOTES
HPI:  Augustine Matthews is a 37 y.o. male who was referred to me by No Primary Care Provider for an inguinal hernia. He  presents today with complaints of left inguinal bulge for 1 month.  The bulge is progressively enlarging and causing discomfort.  He denies any right-sided bulges.  Denies any fevers, chills, nausea or vomiting.    Allergies:Review of patient's allergies indicates no known allergies.    Past Medical History:   Diagnosis Date     Asthma      Spontaneous pneumothorax      Thrombocytopenia     Created by Conversion        Past Surgical History:   Procedure Laterality Date     INGUINAL HERNIA REPAIR       MO REMOVAL PARIETAL PLEURA      Description: Parietal Pleurectomy;  Recorded: 02/17/2012;       CURRENT MEDS:  Current Outpatient Prescriptions   Medication Sig Dispense Refill     albuterol (PROVENTIL HFA;VENTOLIN HFA) 90 mcg/actuation inhaler Inhale 1-2 puffs every 4 (four) hours as needed for wheezing or shortness of breath.       ibuprofen (ADVIL,MOTRIN) 200 MG tablet Take 400 mg by mouth every 6 (six) hours as needed for pain.       oxyCODONE-acetaminophen (PERCOCET) 5-325 mg per tablet Take 1 tablet by mouth as needed.       No current facility-administered medications for this visit.        Family History   Problem Relation Age of Onset     No Medical Problems Mother         reports that he has quit smoking. He has never used smokeless tobacco. He reports that he does not drink alcohol or use illicit drugs.    Review of Systems -   The 12 system review is within normal limits except for as mentioned above.  General ROS: No complaints or constitutional symptoms  Ophthalmic ROS: No complaints of visual changes  Skin: No complaints or symptoms   Endocrine: No complaints or symptoms  Hematologic/Lymphatic: No symptoms or complaints  Psychiatric: No symptoms or complaints  Respiratory ROS: no cough, shortness of breath, or wheezing  Cardiovascular ROS: no chest pain or dyspnea on  "exertion  Gastrointestinal ROS: As per HPI  Genito-Urinary ROS: no dysuria, trouble voiding, or hematuria  Musculoskeletal ROS: no joint or muscle pain  Neurological ROS: no TIA or stroke symptoms    /84  Pulse 67  Ht 5' 9\" (1.753 m)  Wt 159 lb (72.1 kg)  SpO2 98%  BMI 23.48 kg/m2  Body mass index is 23.48 kg/(m^2).    EXAM:  GENERAL: Well developed male, No acute distress, pleasant and conversant   EYES: Pupils equal, round and reactive, no scleral icterus  CARDIAC: Regular rate and rhythm, no obvious murmurs noted  CHEST/LUNG: Clear to ascultation bilaterally, No ronchi, No wheezes  ABDOMEN: Soft, left inguinal bulge reducible, small right inguinal bulge as well, reducible  SKIN: Pink, warm and dry, no obvious rashes or lesions   NEURO:No focal deficits, ambulatory  MUSCULOSKELETAL:No obvious deformities, no swelling, normal appearing            Assessment/Plan:   Augustine Matthews is a 37 y.o. male with a moderately sized left inguinal hernia as well as a's possible small right inguinal hernia.  I have discussed the pathophysiology of inguinal hernias at length as well as the  surgical and non-operative management strategies.      The risks of Robotic, Laparoscopic and open inguinal hernia  surgery were explained in detail which include, but are not limited to, bleeding, infection of the mesh, recurrence of the hernia, chronic pain, poor cosmesis, the need for reoperative intervention, the possibility of conversion from a laparoscopic approach to an open approach, subcutaneous emphysema, injury to vital structures,  blood clots, heart attack, stroke and death.  Additionally, the risks of observation were also discussed in detail which include, but are not limited to, chronic pain, enlargement of the hernia, incarceration, strangulation and death.      He understands everything that was discussed and has consented to proceed with surgery.   We will plan on scheduling a laparoscopic left and possible " laparoscopic right inguinal hernia repair at his desired date.       Ehsan De La Cruz D.O. [facility]  [phone]  [facility] Department of Surgery

## 2021-06-12 NOTE — PROGRESS NOTES
Augustine is scheduled for laparoscopic left inguinal hernia repair with possible right inguinal hernia repair with Dr. De La Cruz on 8/2/17 at [facility]. Patient was given instructions of arrival time, need a , and NPO after midnight. Patient verbalized understanding.     Pre-op by Dr. Dorothy Rushing, [facility]  Physician    [facility]   [phone]'

## 2021-06-13 NOTE — PROGRESS NOTES
Augustine is scheduled for lap left inguinal hernia repair, possible lap right inguinal hernia repair with Dr. De La Cruz on 9/26/17 at [facility]. Patient was given instructions of arrival time, need a , pre-op physical within 30days and NPO after midnight. Patient verbalized understanding.     Sunitha Rushing CMA  Physician    [facility]   [phone]

## 2021-06-24 NOTE — TELEPHONE ENCOUNTER
"RN Triage [facility]    RN Phone Assessment  Calling regarding a rash  See initial assessment questionnaire responses below.    No fever  Otherwise well.     Reviewed Care Advice per Protocol  Advised home care per protocol and encouraged to follow-up with PCP at [facility]. Pt verbalizes understanding and will contact PCP as needed. States no further questions.   Ria Delgado, RN, [facility] Nurse Triage    Reason for Disposition    Mild widespread rash    Answer Assessment - Initial Assessment Questions  1. APPEARANCE of RASH: \"Describe the rash.\" (e.g., spots, blisters, raised areas, skin peeling, scaly)      Red, little bumps that are also red, look like a little bit of flaking on the top    2. SIZE: \"How big are the spots?\" (e.g., tip of pen, eraser, coin; inches, centimeters)      Pinpoint size    3. LOCATION: \"Where is the rash located?\"      Neck, arms, torso, groin, ankles    4. COLOR: \"What color is the rash?\"   Red    5. ONSET: \"When did the rash begin?\"      Noticed it on his neck yesterday, spread to arms, stomach today    6. FEVER: \"Do you have a fever?\" If so, ask: \"What is your temperature, how was it measured, and when did it start?\"     No fever    7. ITCHING: \"Does the rash itch?\" If so, ask: \"How bad is the itch?\" (Scale 1-10; or mild, moderate, severe)      Itches, Mildly itchy, lotion helps    8. CAUSE: \"What do you think is causing the rash?\"      No ideas    9. MEDICATION FACTORS: \"Have you started any new medications within the last 2 weeks?\" (e.g., antibiotics)       No new medications, but started using a new body soap.     10. OTHER SYMPTOMS: \"Do you have any other symptoms?\"   No other symptoms    Protocols used: RASH OR REDNESS - WIDESPREAD-A-AH      "

## 2021-10-27 ENCOUNTER — TELEPHONE (OUTPATIENT)
Dept: FAMILY MEDICINE | Facility: CLINIC | Age: 41
End: 2021-10-27

## 2021-10-27 DIAGNOSIS — J45.40 MODERATE PERSISTENT ASTHMA WITHOUT COMPLICATION: Primary | ICD-10-CM

## 2021-10-27 NOTE — TELEPHONE ENCOUNTER
Patient calling about the mouth piece and tube, side stream nebulizer responics, needs a replacement. The mouth piece and tube broke, patient is not sure where to find replacement. Was wondering if we would be able to replace that or would he be able to get that else where. Please advise.

## 2021-10-27 NOTE — TELEPHONE ENCOUNTER
Requesting replacement parts for nebulizer. Would need to be done via DME. Routing to PCP to fulfill and then route to colored team to fax and coordinate. Gladys FERNANDEZ

## 2021-10-28 NOTE — TELEPHONE ENCOUNTER
Called Augustine, scheduled an upcoming appointment with Dr Daley for asthma follow up and DME order placement during that visit, 11/8/2021. Per patient ok to wait until this date, would prefer to see Dr Daley. Cheryl FERNANDEZ

## 2021-11-17 DIAGNOSIS — J45.20 MILD INTERMITTENT ASTHMA WITHOUT COMPLICATION: ICD-10-CM

## 2021-11-18 RX ORDER — ALBUTEROL SULFATE 0.83 MG/ML
SOLUTION RESPIRATORY (INHALATION)
Qty: 90 ML | Refills: 0 | Status: SHIPPED | OUTPATIENT
Start: 2021-11-18 | End: 2022-02-03

## 2021-12-17 ENCOUNTER — HOME INFUSION (PRE-WILLOW HOME INFUSION) (OUTPATIENT)
Dept: PHARMACY | Facility: CLINIC | Age: 41
End: 2021-12-17
Payer: COMMERCIAL

## 2021-12-19 ENCOUNTER — HOME INFUSION (PRE-WILLOW HOME INFUSION) (OUTPATIENT)
Dept: PHARMACY | Facility: CLINIC | Age: 41
End: 2021-12-19
Payer: COMMERCIAL

## 2022-01-28 ENCOUNTER — TELEPHONE (OUTPATIENT)
Dept: FAMILY MEDICINE | Facility: CLINIC | Age: 42
End: 2022-01-28
Payer: COMMERCIAL

## 2022-01-28 NOTE — TELEPHONE ENCOUNTER
Dr Cloud clarified: Patient needs a STAT venous doppler of R lower extremity due to positive d dimer.    Called patient via  services  ID: [id number]    Left messages on both numbers to call back ASAP regarding urgent results.    Order entered.    Patient needs to call [phone] to schedule ASAP (today)   [facility] phone call message- medication clarification/question:    Full Medication Name: Albuterol   Dose: .083% neb solut                                     Ibuprofen    Dose: 800 MG    Question: Patient is requesting a refill on both medications    Pharmacy confirmed as [facility] #[id number] [address]: Yes    OK to leave a message on voice mail? Yes    Primary language: English      needed? No    Call taken on January 28, 2022 at 1:15 PM by Sakshi Canchola

## 2022-02-03 DIAGNOSIS — J45.20 MILD INTERMITTENT ASTHMA WITHOUT COMPLICATION: ICD-10-CM

## 2022-02-04 RX ORDER — ALBUTEROL SULFATE 0.83 MG/ML
SOLUTION RESPIRATORY (INHALATION)
Qty: 90 ML | Refills: 0 | Status: SHIPPED | OUTPATIENT
Start: 2022-02-04 | End: 2022-03-25

## 2022-02-15 NOTE — PROGRESS NOTES
This is a recent snapshot of the patient's [facility] medical record.  For current drug dose and complete information and questions, call [phone] or In [facility] pool, fv home infusion ([id number])  CSN Number:  [id number]

## 2022-02-15 NOTE — PROGRESS NOTES
This is a recent snapshot of the patient's [facility] medical record.  For current drug dose and complete information and questions, call [phone] or In [facility] pool, fv home infusion ([id number])  CSN Number:  [id number]

## 2022-03-25 DIAGNOSIS — J45.20 MILD INTERMITTENT ASTHMA WITHOUT COMPLICATION: ICD-10-CM

## 2022-03-28 RX ORDER — ALBUTEROL SULFATE 0.83 MG/ML
SOLUTION RESPIRATORY (INHALATION)
Qty: 90 ML | Refills: 0 | Status: SHIPPED | OUTPATIENT
Start: 2022-03-28 | End: 2022-05-03

## 2022-03-28 NOTE — TELEPHONE ENCOUNTER
Patient overdue for follow up. Small refill provided. Please schedule for in-person visit.    Andre Daley III, MD, FAAFP  [facility] Residency Faculty  03/28/22 1:43 PM

## 2022-04-21 ENCOUNTER — TELEPHONE (OUTPATIENT)
Dept: FAMILY MEDICINE | Facility: CLINIC | Age: 42
End: 2022-04-21
Payer: COMMERCIAL

## 2022-04-21 DIAGNOSIS — J45.20 MILD INTERMITTENT ASTHMA WITHOUT COMPLICATION: ICD-10-CM

## 2022-04-21 NOTE — TELEPHONE ENCOUNTER
[facility] phone call message- medication clarification/question:    Full Medication Name: albuterol (PROAIR HFA/PROVENTIL HFA/VENTOLIN HFA) 108 (90 Base) MCG/ACT inhaler    Question: Patient called stating he's been needing refills for inhaler and previously never had an issue with refilling. I don't see any encounter for refills regarding inhaler, he stated [facility] sent refill request. Informed him, Don't see anything on chart and will send message over to refill team.     Pharmacy confirmed as [facility] #[id number] - [place], [place] - [address] [place] [address] AT [facility]: Yes    OK to leave a message on voice mail? Yes    Primary language: English      needed? No    Call taken on April 21, 2022 at 12:58 PM by Dmitri Trujillo

## 2022-04-22 RX ORDER — ALBUTEROL SULFATE 90 UG/1
AEROSOL, METERED RESPIRATORY (INHALATION)
Qty: 8.5 G | Refills: 0 | Status: SHIPPED | OUTPATIENT
Start: 2022-04-22 | End: 2022-05-10

## 2022-05-03 DIAGNOSIS — J45.20 MILD INTERMITTENT ASTHMA WITHOUT COMPLICATION: ICD-10-CM

## 2022-05-03 RX ORDER — ALBUTEROL SULFATE 0.83 MG/ML
SOLUTION RESPIRATORY (INHALATION)
Qty: 90 ML | Refills: 0 | Status: SHIPPED | OUTPATIENT
Start: 2022-05-03 | End: 2022-05-10

## 2022-05-09 ENCOUNTER — TELEPHONE (OUTPATIENT)
Dept: FAMILY MEDICINE | Facility: CLINIC | Age: 42
End: 2022-05-09
Payer: COMMERCIAL

## 2022-05-10 ENCOUNTER — VIRTUAL VISIT (OUTPATIENT)
Dept: FAMILY MEDICINE | Facility: CLINIC | Age: 42
End: 2022-05-10
Payer: COMMERCIAL

## 2022-05-10 DIAGNOSIS — J45.40 MODERATE PERSISTENT ASTHMA WITHOUT COMPLICATION: Primary | ICD-10-CM

## 2022-05-10 DIAGNOSIS — M79.10 MUSCLE PAIN: ICD-10-CM

## 2022-05-10 PROCEDURE — 99442 PR PHYSICIAN TELEPHONE EVALUATION 11-20 MIN: CPT | Performed by: FAMILY MEDICINE

## 2022-05-10 RX ORDER — BUDESONIDE AND FORMOTEROL FUMARATE DIHYDRATE 160; 4.5 UG/1; UG/1
AEROSOL RESPIRATORY (INHALATION)
Qty: 20.4 G | Refills: 11 | Status: SHIPPED | OUTPATIENT
Start: 2022-05-10 | End: 2023-05-15

## 2022-05-10 RX ORDER — IBUPROFEN 800 MG/1
800 TABLET, FILM COATED ORAL EVERY 8 HOURS PRN
Qty: 100 TABLET | Refills: 3 | Status: SHIPPED | OUTPATIENT
Start: 2022-05-10 | End: 2023-05-15

## 2022-05-10 NOTE — PATIENT INSTRUCTIONS
"We should recheck you asthma in 1-2 months.    Your new inhaler is Symbicort and it is \"SMART\" therapy (see below). It should replace your albuterol inhaler and nebulizer.    ---------------------------------------------------------------------    What is SMART Therapy?  SMART is a new way of approaching your asthma care that gives you more medicine when you need it most.     So does this mean I won t be using albuterol anymore?  Correct! You will use just one inhaler for everyday asthma prevention and for acute symptoms (for example: shortness breath, wheezing, etc.). You no longer need to remember which inhaler is which!     My albuterol works great to decrease my breathing difficulties, why shouldn t I use it?  Overuse of albuterol has been associated with increased airway irritation, increased risk of asthma flares, and decreased response to albuterol over time - meaning if you require hospitalization for your asthma and are in need of albuterol to treat severe symptoms, it may be less effective for you.    Why are we changing my inhalers?  Studies show that greater asthma control is achieved with SMART therapy versus your current regimen. Greater asthma control here means fewer flares of your asthma that may lead to emergency department visits or hospitalizations.  This change will enable you to get enough of the preventive medication to control your asthma long-term.   "
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
## 2022-06-15 ENCOUNTER — TELEPHONE (OUTPATIENT)
Dept: FAMILY MEDICINE | Facility: CLINIC | Age: 42
End: 2022-06-15

## 2022-06-17 ENCOUNTER — OFFICE VISIT (OUTPATIENT)
Dept: FAMILY MEDICINE | Facility: CLINIC | Age: 42
End: 2022-06-17
Payer: COMMERCIAL

## 2022-06-17 VITALS
DIASTOLIC BLOOD PRESSURE: 95 MMHG | WEIGHT: 161 LBS | BODY MASS INDEX: 23.05 KG/M2 | TEMPERATURE: 97.9 F | OXYGEN SATURATION: 95 % | RESPIRATION RATE: 18 BRPM | HEART RATE: 88 BPM | SYSTOLIC BLOOD PRESSURE: 136 MMHG | HEIGHT: 70 IN

## 2022-06-17 DIAGNOSIS — Z01.818 PREOP GENERAL PHYSICAL EXAM: Primary | ICD-10-CM

## 2022-06-17 PROCEDURE — 99213 OFFICE O/P EST LOW 20 MIN: CPT | Mod: GC

## 2022-06-17 ASSESSMENT — ASTHMA QUESTIONNAIRES
ACT_TOTALSCORE: 20
QUESTION_4 LAST FOUR WEEKS HOW OFTEN HAVE YOU USED YOUR RESCUE INHALER OR NEBULIZER MEDICATION (SUCH AS ALBUTEROL): ONCE A WEEK OR LESS
QUESTION_1 LAST FOUR WEEKS HOW MUCH OF THE TIME DID YOUR ASTHMA KEEP YOU FROM GETTING AS MUCH DONE AT WORK, SCHOOL OR AT HOME: A LITTLE OF THE TIME
QUESTION_3 LAST FOUR WEEKS HOW OFTEN DID YOUR ASTHMA SYMPTOMS (WHEEZING, COUGHING, SHORTNESS OF BREATH, CHEST TIGHTNESS OR PAIN) WAKE YOU UP AT NIGHT OR EARLIER THAN USUAL IN THE MORNING: ONCE OR TWICE
ACT_TOTALSCORE: 20
QUESTION_5 LAST FOUR WEEKS HOW WOULD YOU RATE YOUR ASTHMA CONTROL: WELL CONTROLLED
QUESTION_2 LAST FOUR WEEKS HOW OFTEN HAVE YOU HAD SHORTNESS OF BREATH: ONCE OR TWICE A WEEK

## 2022-06-17 NOTE — PATIENT INSTRUCTIONS
Preparing for Your Surgery  Getting started  A nurse will call you to review your health history and instructions. They will give you an arrival time based on your scheduled surgery time. Please be ready to share:    Your doctor's clinic name and phone number    Your medical, surgical and anesthesia history    A list of allergies and sensitivities    A list of medicines, including herbal treatments and over-the-counter drugs    Whether the patient has a legal guardian (ask how to send us the papers in advance)  Please tell us if you're pregnant--or if there's any chance you might be pregnant. Some surgeries may injure a fetus (unborn baby), so they require a pregnancy test. Surgeries that are safe for a fetus don't always need a test, and you can choose whether to have one.   If you have a child who's having surgery, please ask for a copy of Preparing for Your Child's Surgery.    Preparing for surgery    Within 30 days of surgery: Have a pre-op exam (sometimes called an H&P, or History and Physical). This can be done at a clinic or pre-operative center.  ? If you're having a , you may not need this exam. Talk to your care team.    At your pre-op exam, talk to your care team about all medicines you take. If you need to stop any medicines before surgery, ask when to start taking them again.  ? We do this for your safety. Many medicines can make you bleed too much during surgery. Some change how well surgery (anesthesia) drugs work.    Call your insurance company to let them know you're having surgery. (If you don't have insurance, call [phone].)    Call your clinic if there's any change in your health. This includes signs of a cold or flu (sore throat, runny nose, cough, rash, fever). It also includes a scrape or scratch near the surgery site.    If you have questions on the day of surgery, call your hospital or surgery center.  COVID testing  You may need to be tested for COVID-19 before having  surgery. If so, we will give you instructions.  Eating and drinking guidelines  For your safety: Unless your surgeon tells you otherwise, follow the guidelines below.    Eat and drink as usual until 8 hours before surgery. After that, no food or milk.    Drink clear liquids until 2 hours before surgery. These are liquids you can see through, like water, Gatorade and Propel Water. You may also have black coffee and tea (no cream or milk).    Nothing by mouth within 2 hours of surgery. This includes gum, candy and breath mints.    If you drink alcohol: Stop drinking it the night before surgery.    If your care team tells you to take medicine on the morning of surgery, it's okay to take it with a sip of water.  Preventing infection    Shower or bathe the night before and morning of your surgery. Follow the instructions your clinic gave you. (If no instructions, use regular soap.)    Don't shave or clip hair near your surgery site. We'll remove the hair if needed.    Don't smoke or vape the morning of surgery. You may chew nicotine gum up to 2 hours before surgery. A nicotine patch is okay.  ? Note: Some surgeries require you to completely quit smoking and nicotine. Check with your surgeon.    Your care team will make every effort to keep you safe from infection. We will:  ? Clean our hands often with soap and water (or an alcohol-based hand rub).  ? Clean the skin at your surgery site with a special soap that kills germs.  ? Give you a special gown to keep you warm. (Cold raises the risk of infection.)  ? Wear special hair covers, masks, gowns and gloves during surgery.  ? Give antibiotic medicine, if prescribed. Not all surgeries need antibiotics.  What to bring on the day of surgery    Photo ID and insurance card    Copy of your health care directive, if you have one    Glasses and hearing aides (bring cases)  ? You can't wear contacts during surgery    Inhaler and eye drops, if you use them (tell us about these when  you arrive)    CPAP machine or breathing device, if you use them    A few personal items, if spending the night    If you have . . .  ? A pacemaker, ICD (cardiac defibrillator) or other implant: Bring the ID card.  ? An implanted stimulator: Bring the remote control.  ? A legal guardian: Bring a copy of the certified (court-stamped) guardianship papers.  Please remove any jewelry, including body piercings. Leave jewelry and other valuables at home.  If you're going home the day of surgery    You must have a responsible adult drive you home. They should stay with you overnight as well.    If you don't have someone to stay with you, and you aren't safe to go home alone, we may keep you overnight. Insurance often won't pay for this.  After surgery  If it's hard to control your pain or you need more pain medicine, please call your surgeon's office.  Questions?   If you have any questions for your care team, list them here: _________________________________________________________________________________________________________________________________________________________________________ ____________________________________ ____________________________________ ____________________________________  For informational purposes only. Not to replace the advice of your health care provider. Copyright   2003, 2019 [facility]. All rights reserved. Clinically reviewed by Ria Huff MD. [employer] [id number] - REV 07/21.
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
## 2022-06-17 NOTE — PROGRESS NOTES
I have personally reviewed the history and examination as documented by Dr. Kitchen.  I was present during key portions of the visit and agree with the assessment and plan as documented for 42 yr old male with jaw cyst concerning for malignancy here for pre-op clearance for upcoming jaw surgery. Precautions given. Anticipatory guidance given.     Clay Lewis MD  June 17, 2022  4:17 PM

## 2022-06-20 ENCOUNTER — LAB (OUTPATIENT)
Dept: FAMILY MEDICINE | Facility: CLINIC | Age: 42
End: 2022-06-20
Payer: COMMERCIAL

## 2022-06-20 DIAGNOSIS — Z01.818 PREOP GENERAL PHYSICAL EXAM: ICD-10-CM

## 2022-06-20 LAB — SARS-COV-2 RNA RESP QL NAA+PROBE: NEGATIVE

## 2022-06-20 PROCEDURE — U0003 INFECTIOUS AGENT DETECTION BY NUCLEIC ACID (DNA OR RNA); SEVERE ACUTE RESPIRATORY SYNDROME CORONAVIRUS 2 (SARS-COV-2) (CORONAVIRUS DISEASE [COVID-19]), AMPLIFIED PROBE TECHNIQUE, MAKING USE OF HIGH THROUGHPUT TECHNOLOGIES AS DESCRIBED BY CMS-2020-01-R: HCPCS

## 2022-06-20 PROCEDURE — U0005 INFEC AGEN DETEC AMPLI PROBE: HCPCS

## 2022-06-27 ENCOUNTER — TELEPHONE (OUTPATIENT)
Dept: FAMILY MEDICINE | Facility: CLINIC | Age: 42
End: 2022-06-27

## 2022-07-12 ENCOUNTER — TELEPHONE (OUTPATIENT)
Dept: FAMILY MEDICINE | Facility: CLINIC | Age: 42
End: 2022-07-12

## 2022-07-12 NOTE — TELEPHONE ENCOUNTER
Augustine informed Dr Daley is not expected in clinic this week. Would like to offer Augustine an appointment to assess symptoms and request for refill of medications with one of our other physicians. Augustine will check with his transportation and call clinic back to schedule an appointment for tomorrow. Stable breathing at this time, voiced not really available to come in this afternoon also as he's going to a family barbeque. Cheryl FERNANDEZ

## 2022-07-12 NOTE — TELEPHONE ENCOUNTER
[facility] phone call message- medication clarification/question:    Full Medication Name: albuterol (PROVENTIL) & lidocaine (XYLOCAINE)     Dose: (2.5 MG/3ML) 0.083% neb solution & 2 % external gel     Question: Per patient requesting medications again due to past days it has been humid and dry and informs albuterol has been helping him, as for the lidocaine back has been hurting again and would like it to help as it did in the past. Patient informs due to lack of transportation it has been hard to get in to see , did advise if an appointment is requested virtual is always an option we can schedule him for if he has trouble with ride.  Patient verbalized understanding.    Pharmacy confirmed [place] [facility] [id number] [place] [facility] [place] [address] [facility] [address]: Yes    OK to leave a message on voice mail? Yes    Primary language: English      needed? No    Call taken on July 12, 2022 at 1:01 PM by Kriss Mcleod

## 2022-07-14 ENCOUNTER — VIRTUAL VISIT (OUTPATIENT)
Dept: FAMILY MEDICINE | Facility: CLINIC | Age: 42
End: 2022-07-14
Payer: COMMERCIAL
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
## 2022-07-14 DIAGNOSIS — G89.29 CHRONIC MIDLINE LOW BACK PAIN WITHOUT SCIATICA: ICD-10-CM

## 2022-07-14 DIAGNOSIS — M54.50 CHRONIC MIDLINE LOW BACK PAIN WITHOUT SCIATICA: ICD-10-CM

## 2022-07-14 DIAGNOSIS — J45.40 MODERATE PERSISTENT ASTHMA WITHOUT COMPLICATION: Primary | ICD-10-CM

## 2022-07-14 PROCEDURE — 99442 PR PHYSICIAN TELEPHONE EVALUATION 11-20 MIN: CPT | Mod: GC | Performed by: STUDENT IN AN ORGANIZED HEALTH CARE EDUCATION/TRAINING PROGRAM

## 2022-07-14 RX ORDER — LIDOCAINE 40 MG/G
CREAM TOPICAL
Qty: 30 G | Refills: 1 | Status: SHIPPED | OUTPATIENT
Start: 2022-07-14 | End: 2023-07-13

## 2022-07-14 ASSESSMENT — ASTHMA QUESTIONNAIRES: ACT_TOTALSCORE: 15
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
## 2022-07-14 NOTE — PROGRESS NOTES
Preceptor Attestation:   Patient evaluated and discussed with the resident. I have verified the content of the note, which accurately reflects my assessment of the patient and the plan of care.    Supervising Physician:Becka Moran MD    [facility]

## 2022-08-11 RX ORDER — ALBUTEROL SULFATE 0.83 MG/ML
2.5 SOLUTION RESPIRATORY (INHALATION) EVERY 6 HOURS PRN
Qty: 90 ML | OUTPATIENT
Start: 2022-08-11

## 2022-08-11 NOTE — TELEPHONE ENCOUNTER
Per current guidelines, patient has been switched to SMART therapy with Symbicort. If patient is continuing to have difficulty with his inhalers and/or symptoms, recommend follow up with either PharmD (preferred for review of inhaler technique) or Green Team. Virtual would be fine.    Andre Daley III, MD, FAAFP  [facility]  08/11/22 1:00 PM

## 2022-08-12 NOTE — TELEPHONE ENCOUNTER
Called pt regarding the med refill.    Offer to schedule the davide with Virginie DARNELL Pt refuse to schedule today.    Would gave us a call to schedule for the appt.    Elizabeth QUINTERO

## 2022-09-15 ENCOUNTER — PATIENT OUTREACH (OUTPATIENT)
Dept: FAMILY MEDICINE | Facility: CLINIC | Age: 42
End: 2022-09-15

## 2022-11-16 ENCOUNTER — NURSE TRIAGE (OUTPATIENT)
Dept: NURSING | Facility: CLINIC | Age: 42
End: 2022-11-16
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
## 2022-12-01 ENCOUNTER — TELEPHONE (OUTPATIENT)
Dept: FAMILY MEDICINE | Facility: CLINIC | Age: 42
End: 2022-12-01

## 2022-12-01 NOTE — TELEPHONE ENCOUNTER
Called pt for a follow up and there is no answer.    Patient Quality Outreach    Patient is due for the following:   Asthma  -  Asthma follow-up visit    Next Steps:   No Answer    Type of outreach:    Phone, left message for patient/parent to call back.    Next Steps:  Reach out within 90 days via Phone.    Max number of attempts reached: Yes. Will try again in 90 days if patient still on fail list.    Questions for provider review:         Elizabeth Way  Chart routed to Provider.

## 2023-01-09 ENCOUNTER — TELEPHONE (OUTPATIENT)
Dept: FAMILY MEDICINE | Facility: CLINIC | Age: 43
End: 2023-01-09

## 2023-01-09 NOTE — TELEPHONE ENCOUNTER
Patient Quality Outreach    Patient is due for the following:   Asthma  -  Asthma follow-up visit    Next Steps:   Schedule a office visit for ACT    Type of outreach:    Phone, spoke to patient/parent. 2/7/23    Next Steps:  Reach out within 90 days via Phone.    Max number of attempts reached: Yes. Will try again in 90 days if patient still on fail list.    Questions for provider review:    None     Elizabeth Way  Chart routed to Provider.

## 2023-02-15 ENCOUNTER — PATIENT OUTREACH (OUTPATIENT)
Dept: FAMILY MEDICINE | Facility: CLINIC | Age: 43
End: 2023-02-15
Payer: COMMERCIAL

## 2023-02-15 NOTE — TELEPHONE ENCOUNTER
Patient Quality Outreach    Patient is due for the following:   Asthma  -  Asthma follow-up visit    Next Steps:   Schedule a office visit for Asthma fu and ACT    Type of outreach:    Phone, left message for patient/parent to call back.      Questions for provider review:    None     Brisa Reese  Chart routed to Care Team.

## 2023-05-11 DIAGNOSIS — J45.40 MODERATE PERSISTENT ASTHMA WITHOUT COMPLICATION: ICD-10-CM

## 2023-05-15 RX ORDER — BUDESONIDE AND FORMOTEROL FUMARATE DIHYDRATE 160; 4.5 UG/1; UG/1
AEROSOL RESPIRATORY (INHALATION)
Qty: 20.4 G | OUTPATIENT
Start: 2023-05-15

## 2023-05-30 ENCOUNTER — TELEPHONE (OUTPATIENT)
Dept: FAMILY MEDICINE | Facility: CLINIC | Age: 43
End: 2023-05-30
Payer: COMMERCIAL

## 2023-05-30 DIAGNOSIS — J45.40 MODERATE PERSISTENT ASTHMA WITHOUT COMPLICATION: ICD-10-CM

## 2023-05-30 NOTE — TELEPHONE ENCOUNTER
[facility] phone call message- medication clarification/question:    Full Medication Name: budesonide-formoterol (SYMBICORT) 160-4.5 MCG/ACT Inhaler    Question: Patient called stating medication was sent to wrong pharmacy, informed him [facility] can pull prescription, patient stated he just got off the phone with pharmacy and they told him to call clinic about some authorization needed. Please call for clarification and advise. Or resend prescription to correct pharmacy below thank you.    Pharmacy confirmed as [id number] - [place], [place] - [facility] [address]: Yes    OK to leave a message on voice mail? Yes    Primary language: English      needed? No    Call taken on May 30, 2023 at 10:47 AM by Dmitri Shahid

## 2023-05-30 NOTE — TELEPHONE ENCOUNTER
Writer called both [facility] locations, [facility] in [place] has already pulled this rx to be filled at their location (preferred by pt). Insurance is not covering generic Budesonide Formoterol. Tech ran rx through as brand name Symbicort, this went through fine and is covered by patient's pharmacy benefits. Pharmacy will further prepare rx fill. Writer called patient, informed him of above completed actions. No further intervention is needed at this time. Cheryl RN

## 2023-06-02 RX ORDER — BUDESONIDE AND FORMOTEROL FUMARATE DIHYDRATE 160; 4.5 UG/1; UG/1
AEROSOL RESPIRATORY (INHALATION)
Qty: 20.4 G | OUTPATIENT
Start: 2023-06-02

## 2023-06-02 RX ORDER — BUDESONIDE AND FORMOTEROL FUMARATE DIHYDRATE 160; 4.5 UG/1; UG/1
AEROSOL RESPIRATORY (INHALATION)
Qty: 20.4 G | Refills: 3 | Status: SHIPPED | OUTPATIENT
Start: 2023-06-02 | End: 2023-07-13

## 2023-07-05 ENCOUNTER — PATIENT OUTREACH (OUTPATIENT)
Dept: FAMILY MEDICINE | Facility: CLINIC | Age: 43
End: 2023-07-05
Payer: COMMERCIAL

## 2023-07-05 NOTE — TELEPHONE ENCOUNTER
Patient Quality Outreach    Patient is due for the following:   Asthma  -  Asthma follow-up visit    Next Steps:   Patient was scheduled for ACT 7/13/23 at 3:40pm     Type of outreach:    Phone, spoke to patient/parent. scheduled     Next Steps:  Reach out within 90 days via Phone.    Max number of attempts reached: Yes. Will try again in 90 days if patient still on fail list.    Questions for provider review:    None           Elizabeth Way  Chart routed to Care Team.

## 2023-07-13 ENCOUNTER — OFFICE VISIT (OUTPATIENT)
Dept: FAMILY MEDICINE | Facility: CLINIC | Age: 43
End: 2023-07-13
Payer: COMMERCIAL

## 2023-07-13 VITALS
RESPIRATION RATE: 18 BRPM | BODY MASS INDEX: 22.48 KG/M2 | OXYGEN SATURATION: 98 % | SYSTOLIC BLOOD PRESSURE: 123 MMHG | HEART RATE: 98 BPM | WEIGHT: 157 LBS | DIASTOLIC BLOOD PRESSURE: 86 MMHG

## 2023-07-13 DIAGNOSIS — M54.50 CHRONIC MIDLINE LOW BACK PAIN WITHOUT SCIATICA: ICD-10-CM
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
## 2023-07-13 DIAGNOSIS — J45.40 MODERATE PERSISTENT ASTHMA WITHOUT COMPLICATION: ICD-10-CM

## 2023-07-13 DIAGNOSIS — G89.29 CHRONIC MIDLINE LOW BACK PAIN WITHOUT SCIATICA: ICD-10-CM

## 2023-07-13 PROCEDURE — 99214 OFFICE O/P EST MOD 30 MIN: CPT | Mod: GC | Performed by: MASSAGE THERAPIST

## 2023-07-13 RX ORDER — NAPROXEN 500 MG/1
TABLET ORAL
Qty: 45 TABLET | Refills: 3 | Status: SHIPPED | OUTPATIENT
Start: 2023-07-13 | End: 2024-09-22

## 2023-07-13 RX ORDER — NAPROXEN 500 MG/1
TABLET ORAL
COMMUNITY
Start: 2022-05-13 | End: 2023-07-13

## 2023-07-13 RX ORDER — HYDROCODONE BITARTRATE AND ACETAMINOPHEN 5; 325 MG/1; MG/1
1 TABLET ORAL
Status: CANCELLED | OUTPATIENT
Start: 2023-07-13

## 2023-07-13 RX ORDER — HYDROCODONE BITARTRATE AND ACETAMINOPHEN 5; 325 MG/1; MG/1
1 TABLET ORAL
COMMUNITY
Start: 2022-05-13 | End: 2024-07-11

## 2023-07-13 RX ORDER — LIDOCAINE 50 MG/G
OINTMENT TOPICAL DAILY PRN
Qty: 240 G | Refills: 1 | Status: SHIPPED | OUTPATIENT
Start: 2023-07-13 | End: 2024-08-12

## 2023-07-13 RX ORDER — BUDESONIDE AND FORMOTEROL FUMARATE DIHYDRATE 160; 4.5 UG/1; UG/1
AEROSOL RESPIRATORY (INHALATION)
Qty: 20.4 G | Refills: 3 | Status: SHIPPED | OUTPATIENT
Start: 2023-07-13 | End: 2024-01-18

## 2023-07-13 ASSESSMENT — ASTHMA QUESTIONNAIRES: ACT_TOTALSCORE: 11
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
## 2023-07-13 NOTE — PROGRESS NOTES
"Singulair    2 puffs twice a day            {F2 to delete after use    Single Maintenance and Reliever Therapy (SMART)     Doses LOW  (Adult: 200-400 mcg/day)  (Peds: 100-200 mcg/day) MEDIUM  (Adult: >400-800 mcg/day)  (Peds: >200-400 mcg/day) High  (Adult: >800 mcg/day)  (Peds: >400 mcg/day)   Symbicort  Budesonide-formoterol HFA 80-4.5  160-4.5 2 BID + 2 PRN  - -  2 BID + 1 PRN -  3 BID + 1 PRN     Adult (>/= 12 years maximum = 12 puffs/day  Pediatric (6-11 years) = 8 puffs/day     LOW  (Adult: 200-400 mcg/day)  (Peds: 100 mcg/day) MEDIUM  (Adult: 200-400 mcg/day)  (Peds: 100 mcg/day) High  (Adult: 400 mcg/day)  (Peds: 200 mcg/day)   Dulera  Mometasone-formoterol -5  200-5 1-2 BID + 2 PRN  - 1-2 BID + 2 PRN  - -  2 BID + 1 PRN     Adult (>/= 12 years maximum = 11 puffs/day  Pediatric (6-11 years) = 7 puffs/day   Tip for Entering Rx:   1) Use radio buttons for maintenance dose,   2) Add PRN details to : \"Add additional information to the patient sig\"   i.e. , in addition to 1-2 puffs as needed for shortness of breath, up to *** puffs per day  3) Quantity : Recommend 2 inhalers per fill       Key: MDI: Metered dose inhaler, DPI: Dry powder inhaler (breath activated), SMI: Soft mist inhaler              ICS Adults and Children >12             Doses LOW MEDIUM HIGH Notes   Beclomethasone HFA   (Qvar Redihaler) - *See notes 40  80 1-2 BID  1 BID  () -  2 BID  (161-320) -  3 BID  (>320) Breath activated inhalation aerosol   Budesonide  (Pulmicort Flexhaler) - DPI 90  180 1-2 BID  1 BID  (180-360) -  2 BID  (361-720) -  3 BID  (>720) Contains milk protein   Ciclesonide HFA   (Alvesco) - MDI 80  160 1 BID  -  () 2 BID  1 BID  (161-320) -  2 BID  (>320)     Fluticasone propionate HFA (Flovent HFA) - MDI 44  110  220 1-2 BID  1 BID  -  () -  2 BID  1 BID  (221-440) -  -  2 BID  (>440)    Fluticasone propionate  (Flovent Diskus) - DPI 50  100  250 1-2 BID  1 BID  -  (100-250) -  2 BID  1 " BID  (251-500) -  -  2 BID  (>500) Contains lactose   Fluticasone furoate  (Arnuity Ellipta) -   200 -  - 1 QD  -  (100) -  1 QD  (200) Once daily  Contains lactose   Mometasone   (Asmanex Twisthaler) -   220 1 BID  -  (110-220) 2 BID  1 BID  (221-440) -  2 BID  (>440) Contains milk protein   Mometasone HFA   (Asmanex HFA) -   200 1 BID  -  (100-200) 2 BID  1 BID  (201-400) -  2 BID  (>400)      ICS-LABA    LOW MEDIUM HIGH Notes   Budesonide-formeoterol HFA   (Symbicort) - MDI 80-4.5   2 BID   160-4.5   2 BID -    Fluticasone furoate-vilanterol  (Breo Ellipta) - DPI   - 100-25   1 -25  1 QD Not for <19 yo  Contains lactose   Fluticasone propionate-salmeterol HFA (Advair HFA) - MDI   45-21  2 -21  2 -21  2 BID    Fluticasone propionate-salmeterol   (Advair Diskus) - -50  1 -50  1 -50  1 BID   Contains lactose   Fluticasone propionate-salmeterol  (AirDuo Respiclick) - DPI 55-14  1 -14  1 -14  1 BID Contains lactose   Mometasone-formoterol HFA   (Dulera) - MDI   - 100-5  2 -5  2 BID      LABA  Arformoterol  (Brovana) - Nebulized solution   15 mcg, 1 vial BID   Formoterol  (Perforomist) - Nebulized solution   20 mcg, 1 vial BID   Indacaterol  (Arcapta Neohaler) - DPI   75 mcg, 1 capsule QD  (Contains lactose, milk protein)   Olodaterol  (Striverdi Respimat) - SMI   2.5 mcg, 2 inhalations QD   Salmeterol   (Serevent Diskus) - DPI   50 mcg, 1 inhalation BID  (Contains lactose)     LAMA   Dosing   Tiotropium   (Spiriva) Handihaler (DPI): 1 capsule (18 mcg) QD  (Contains milk protein)  Respimat (SMI): 2 inhalations QD (2.5 mcg per actuation)     Umeclidinium   (Incruse Ellipta) - DPI   1 inhalation (62.5 mcg) QD  (Contains lactose)     Aclidinium   (Tudorza Pressair) - DPI   1 inhalation BID (400 mcg per inhalation)  (Contains milk protein)     Glycopyrrolate      Seebri Neohaler (DPI): 1 capsule (15.6 mcg) BID  (Contains lactose, milk  protein)  Lonhala Magnair (Nebulized solution): 25 mcg, 1 vial BID       LABA-LAMA   Dosing   Indacaterol/Glycopyrrolate   (Utibron Neohaler) - DPI   1 capsule (27.5 mcg-15.6 mcg) BID  (Contains lactose, milk protein)   Formoterol/Glycopyrrolate  (Bevespi Aerosphere) - MDI   2 inhalations BID (4.8 mcg-9 mcg per inhalation)   Olodaterol/Tiotropium   (Stiolto Respimat) - SMI   2 inhalation QD (2.5 mcg-2.5 mcg per inhalation)   Vilanterol/Umeclidinium   (Anoro Ellipta) - DPI   1 inhalation (25 mcg-62.5 mcg) QD  (Contains milk protein)     [id number]   Dosing   Fluticasone furoate / Vilanterol / Umeclidinium  (Trelegy Ellipta) - DPI 1 inhalation QD   (100 mcg-25 mcg-62.5 mcg per inhalation)  (Contains lactose)     Budesonide / Formoterol / Glycopyrrolate  (Breztri) - MDI   2 inhalations BID  (160 mcg-4.8 mcg-9 mcg per inhalation)           Updated 11/2022}

## 2023-07-13 NOTE — PROGRESS NOTES
Preceptor Attestation:   Patient seen, evaluated and discussed with the resident. I have verified the content of the note, which accurately reflects my assessment of the patient and the plan of care.    Supervising Physician:Becka Moran MD    [facility]

## 2023-07-26 ENCOUNTER — TELEPHONE (OUTPATIENT)
Dept: FAMILY MEDICINE | Facility: CLINIC | Age: 43
End: 2023-07-26
Payer: COMMERCIAL

## 2023-07-26 DIAGNOSIS — F81.9 LEARNING DISABILITY: ICD-10-CM

## 2023-07-26 DIAGNOSIS — Z86.59 HISTORY OF ADHD: ICD-10-CM

## 2023-07-26 DIAGNOSIS — Z87.820 HISTORY OF TRAUMATIC BRAIN INJURY: ICD-10-CM

## 2023-07-26 DIAGNOSIS — G89.29 CHRONIC MIDLINE LOW BACK PAIN WITHOUT SCIATICA: Primary | ICD-10-CM

## 2023-07-26 DIAGNOSIS — Z59.00 HOMELESS: ICD-10-CM

## 2023-07-26 DIAGNOSIS — M54.50 CHRONIC MIDLINE LOW BACK PAIN WITHOUT SCIATICA: Primary | ICD-10-CM

## 2023-07-26 SDOH — ECONOMIC STABILITY - HOUSING INSECURITY: HOMELESSNESS UNSPECIFIED: Z59.00

## 2023-07-26 NOTE — TELEPHONE ENCOUNTER
[facility] phone call message- general phone call:    Reason for call: Patient is calling because he was last seen with Dr. Conway. He need some forms to be filled out. Looks like Dr. Conway was not comfortable signing the forms and was discussed to come back in clinic the new 2 weeks to have it filled out. Patient is not happy and is needing the forms to be filled out by Dr. Daley and signed. Patient is asking for a call back from PCP regarding this situation. Please call and advise, thank you    Return call needed: Yes    OK to leave a message on voice mail? Yes    Primary language: English      needed? No    Call taken on July 26, 2023 at 3:34 PM by Michael Pappas

## 2023-07-31 NOTE — TELEPHONE ENCOUNTER
Patient is calling back because patient is needing the forms to be filled out and it has been awhile and nothing has been done yet per patient. Patient stated that he's been patiently waiting for these forms to be completed and yet has not heard anything back yet. Dr. Conway should have the forms per patient. Please call and advise, thank you.
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
## 2023-08-08 ENCOUNTER — PATIENT OUTREACH (OUTPATIENT)
Dept: CARE COORDINATION | Facility: CLINIC | Age: 43
End: 2023-08-08
Payer: COMMERCIAL

## 2023-08-08 NOTE — PROGRESS NOTES
Clinic Care Coordination Contact  Follow Up Progress Note      Assessment: There was a care coordination referral put in for the pt for housing, food, and transportation. I called the pt to give him some resources, but he did not answer his phone, so I left a vm for the pt to give me a call back. I tried calling the pt three time, and did not get a hold of the pt or heard from him.    Care Gaps:    Health Maintenance Due   Topic Date Due    YEARLY PREVENTIVE VISIT  Never done    ADVANCE CARE PLANNING  Never done    HEPATITIS B IMMUNIZATION (1 of 3 - 3-dose series) Never done    COVID-19 Vaccine (1) Never done    Pneumococcal Vaccine: Pediatrics (0 to 5 Years) and At-Risk Patients (6 to 64 Years) (2 - PCV) 03/25/2012    LIPID  Never done    ASTHMA ACTION PLAN  06/11/2019           Care Plans      Intervention/Education provided during outreach:               Plan:     Care Coordinator will follow up in

## 2023-10-16 ENCOUNTER — HOSPITAL ENCOUNTER (EMERGENCY)
Facility: CLINIC | Age: 43
Discharge: HOME | End: 2023-10-16
Admitting: PHYSICIAN ASSISTANT
Payer: COMMERCIAL

## 2023-10-16 ENCOUNTER — NURSE TRIAGE (OUTPATIENT)
Dept: NURSING | Facility: CLINIC | Age: 43
End: 2023-10-16
Payer: COMMERCIAL

## 2023-10-16 ENCOUNTER — APPOINTMENT (OUTPATIENT)
Dept: RADIOLOGY | Facility: CLINIC | Age: 43
End: 2023-10-16
Attending: PHYSICIAN ASSISTANT
Payer: COMMERCIAL

## 2023-10-16 VITALS
RESPIRATION RATE: 18 BRPM | OXYGEN SATURATION: 100 % | HEIGHT: 68 IN | DIASTOLIC BLOOD PRESSURE: 84 MMHG | TEMPERATURE: 98.5 F | SYSTOLIC BLOOD PRESSURE: 138 MMHG | HEART RATE: 97 BPM | BODY MASS INDEX: 22.73 KG/M2 | WEIGHT: 150 LBS

## 2023-10-16 DIAGNOSIS — M25.521 RIGHT ELBOW PAIN: ICD-10-CM

## 2023-10-16 DIAGNOSIS — M70.21 OLECRANON BURSITIS OF RIGHT ELBOW: ICD-10-CM

## 2023-10-16 PROCEDURE — 96374 THER/PROPH/DIAG INJ IV PUSH: CPT

## 2023-10-16 PROCEDURE — 99284 EMERGENCY DEPT VISIT MOD MDM: CPT | Mod: 25

## 2023-10-16 PROCEDURE — 250N000011 HC RX IP 250 OP 636: Performed by: PHYSICIAN ASSISTANT

## 2023-10-16 PROCEDURE — 73080 X-RAY EXAM OF ELBOW: CPT | Mod: RT

## 2023-10-16 RX ORDER — KETOROLAC TROMETHAMINE 15 MG/ML
15 INJECTION, SOLUTION INTRAMUSCULAR; INTRAVENOUS ONCE
Status: DISCONTINUED | OUTPATIENT
Start: 2023-10-16 | End: 2023-10-16

## 2023-10-16 RX ORDER — KETOROLAC TROMETHAMINE 15 MG/ML
15 INJECTION, SOLUTION INTRAMUSCULAR; INTRAVENOUS ONCE
Status: COMPLETED | OUTPATIENT
Start: 2023-10-16 | End: 2023-10-16

## 2023-10-16 RX ADMIN — KETOROLAC TROMETHAMINE 15 MG: 15 INJECTION INTRAMUSCULAR; INTRAVENOUS at 16:40

## 2023-10-16 ASSESSMENT — ACTIVITIES OF DAILY LIVING (ADL): ADLS_ACUITY_SCORE: 35

## 2023-10-16 NOTE — ED PROVIDER NOTES
EMERGENCY DEPARTMENT ENCOUNTER      NAME: Augustine Matthews  AGE: 43 year old male  YOB: 1980  MRN: [id number]  EVALUATION DATE & TIME: No admission date for patient encounter.    PCP: Andre Daley    ED PROVIDER: Sharif Whitney PA-C      Chief Complaint   Patient presents with    Elbow Injury         FINAL IMPRESSION:  1. Right elbow pain    2. Olecranon bursitis of right elbow          ED COURSE & MEDICAL DECISION MAKING:    Pertinent Labs & Imaging studies reviewed. (See chart for details)  3:14 PM The PA student met the patient and performed my initial interview and exam.   3:35 PM I met the patient for my initial evaluation   4:19 PM Patient updated on imaging, plan for discharge.     43 year old male presents to the Emergency Department for evaluation of right sided elbow pain    ED Course as of 10/16/23 1624   Mon Oct 16, 2023   1544 Is a 43-year-old male, no significant past medical history, presents emergency department for evaluation of right-sided elbow swelling.  He notes that he hit his elbow on a door frame yesterday, although now swollen, painful and red.  No fevers at home.  On examination here, is significant redness, and swelling to the right side of the elbow, however does not overall appear septic.  Seems more consistent with olecranon bursitis.  Will obtain x-ray here in the emergency department to ensure there is no fractures.  We will give a dose of Toradol here in the emergency department, has not taken any significant medications for pain.  Do not think this is overall representative of septic bursitis given the lack of any other systemic findings, and the acute onset of this with a recent trauma.  We will ensure that there is no fractures, given the patient's and pain control, and he can follow-up as an outpatient.  He is agreeable with this plan.   1604 I have independently reviewed the x-ray of the right elbow, do not appreciate any fractures or dislocations.   Pending final radiology read.   1618 Elbow XR, G/E 3 views, right  X-ray shows soft tissue swelling over the olecranon, no fractures evident.  No joint effusion.  Normal alignment.   1623 And updated on imaging results here, exam consistent with olecranon bursitis.  Recommend that he follows up with outpatient orthopedics if symptoms do not improve.  He is agreeable with plan for discharge here.  Plan for discharge at this time.  Discussed return precautions, he expressed understanding.       Medical Decision Making    History:  Supplemental history from: Documented in chart, if applicable  External Record(s) reviewed: Documented in chart, if applicable.    Work Up:  Chart documentation includes differential considered and any EKGs or imaging independently interpreted by provider, where specified.  In additional to work up documented, I considered the following work up: Documented in chart, if applicable.    External consultation:  Discussion of management with another provider: Documented in chart, if applicable    Complicating factors:  Care impacted by chronic illness: Mental Health  Care affected by social determinants of health: N/A    Disposition considerations: Discharge. No recommendations on prescription strength medication(s). N/A.       At the conclusion of the encounter I discussed the results of all of the tests and the disposition. The questions were answered. The patient or family acknowledged understanding and was agreeable with the care plan.       0 minutes of critical care time     MEDICATIONS GIVEN IN THE EMERGENCY:  Medications   ketorolac (TORADOL) injection 15 mg (has no administration in time range)       NEW PRESCRIPTIONS STARTED AT TODAY'S ER VISIT  New Prescriptions    No medications on file       =================================================================    HPI    Patient information was obtained from: Patient     Use of : N/A        Augustine Matthews is a 43 year old  "male with a pertinent history of ADHD, thrombocytopenia who presents to this ED for evaluation of right-sided elbow pain.  Patient reportedly hit his elbow, was resting his elbow on multiple times yesterday, hit his elbow on a door frame.  Today developed some significant swelling and pain to the area.  He does not take any medications for it.  No fevers.  No nausea or vomiting.  Able to flex and extend without difficulty.    PAST MEDICAL HISTORY:  Past Medical History:   Diagnosis Date    Uncomplicated asthma        PAST SURGICAL HISTORY:  Past Surgical History:   Procedure Laterality Date    ENT SURGERY      nose fiaxation, broken    HERNIA REPAIR      febuary/march 2018    INGUINAL HERNIA REPAIR      THORACOSCOPIC PLEURODESIS      ZZC REMOVAL PARIETAL PLEURA      Description: Parietal Pleurectomy;  Recorded: 02/17/2012;           CURRENT MEDICATIONS:    budesonide-formoterol (SYMBICORT) 160-4.5 MCG/ACT Inhaler  HYDROcodone-acetaminophen (NORCO) 5-325 MG tablet  ibuprofen (ADVIL/MOTRIN) 800 MG tablet  lidocaine (XYLOCAINE) 5 % external ointment  naproxen (NAPROSYN) 500 MG tablet         ALLERGIES:  Allergies   Allergen Reactions    No Known Allergies        FAMILY HISTORY:  Family History   Problem Relation Age of Onset    Asthma Mother     Depression Mother     Lung Cancer Maternal Grandfather     Diabetes No family hx of     Hypertension No family hx of     No Known Problems Mother        SOCIAL HISTORY:   Social History     Socioeconomic History    Marital status: Single   Tobacco Use    Smoking status: Former    Smokeless tobacco: Never    Tobacco comments:     exposure to smokers   Substance and Sexual Activity    Alcohol use: No    Drug use: No       VITALS:  BP (!) 149/87   Pulse 118   Temp 98.5  F (36.9  C) (Oral)   Resp 18   Ht 1.727 m (5' 8\")   Wt 68 kg (150 lb)   SpO2 99%   BMI 22.81 kg/m      PHYSICAL EXAM    Physical Exam  Vitals and nursing note reviewed.   Constitutional:       General: He is " not in acute distress.     Appearance: Normal appearance. He is normal weight. He is not toxic-appearing or diaphoretic.   HENT:      Right Ear: External ear normal.      Left Ear: External ear normal.   Eyes:      Conjunctiva/sclera: Conjunctivae normal.   Cardiovascular:      Rate and Rhythm: Regular rhythm. Tachycardia present.      Pulses: Normal pulses.   Abdominal:      General: Abdomen is flat.      Palpations: Abdomen is soft.   Musculoskeletal:         General: Swelling, tenderness and signs of injury present.      Comments: Tenderness, and some mild redness to the posterior aspect of the right elbow, consistent with olecranon bursitis.  Pulses intact.  Right able to flex and extend without significant abnormality.   Skin:     Findings: Erythema present. No rash.   Neurological:      Mental Status: He is alert. Mental status is at baseline.           LAB:  All pertinent labs reviewed and interpreted.  Labs Ordered and Resulted from Time of ED Arrival to Time of ED Departure - No data to display    RADIOLOGY:  Reviewed all pertinent imaging. Please see official radiology report.  Elbow XR, G/E 3 views, right   Final Result   IMPRESSION: Soft tissue swelling over the olecranon. No fractures are evident. No elbow joint effusion. Normal alignment.        PROCEDURES:   None.     Sharif Whitney PA-C  Emergency Medicine  [facility] EMERGENCY ROOM  [address] [facility] [address] [phone]  Dept: [phone]       Sharif Whitney PA-C  10/16/23 0212

## 2023-10-16 NOTE — ED TRIAGE NOTES
Patient presents to ED via EMS, patient reports hitting elbow on doorframe last night and now elbow is swollen, painful and red.  Trish Marmolejo RN.......10/16/2023 2:22 PM     Triage Assessment (Adult)       Row Name 10/16/23 1421          Triage Assessment    Airway WDL WDL        Respiratory WDL    Respiratory WDL WDL        Skin Circulation/Temperature WDL    Skin Circulation/Temperature WDL WDL        Cardiac WDL    Cardiac WDL WDL        Peripheral/Neurovascular WDL    Peripheral Neurovascular WDL WDL        Cognitive/Neuro/Behavioral WDL    Cognitive/Neuro/Behavioral WDL WDL

## 2023-10-16 NOTE — DISCHARGE INSTRUCTIONS
You were seen here in the emergency department for evaluation of right-sided arm swelling, redness, seems consistent with a bursitis.  X-rays here do not show any acute abnormalities.  Please use ibuprofen, Tylenol, and ice at home.  Follow-up with your primary care doctor as needed.    For pain or fever you may use:  -Tylenol 650 mg every 6 hours.  Max 4000 mg in 24 hours  Do not use thismedication with alcohol as it can cause liver problems.  -Ibuprofen 600 mg every 6 hours.  Max 3500 mg in 24 hours  Do not take this medication if you have a history of a GI bleed or have kidney problems.  You may use both of these medications at the same time or you can alternate them every 3 hours.  For example, Tylenol at 6 AM, ibuprofen at 9 AM, Tylenol at noon, etc.

## 2023-10-16 NOTE — TELEPHONE ENCOUNTER
Nurse Triage SBAR    Is this a 2nd Level Triage? NO    Situation: Patient calling with swelling and pain in right elbow that came on last night and has worsened today.  Consent: not needed    Background: Patient calling about right elbow pain since last night.  Can not remember any specific injury or cause. No obvious signs of injury - no bleeding or dislocation  Swollen, tender to touch    Assessment: '  Right elbow pain - rates pain 10/10  Swelling - severe  Red, warm to touch  No fever, but feels cold and clammy  Chest tightness    Protocol Recommended Disposition:   Go to office now    Recommendation: Advised patient to go to urgent care or ED now . Care advice given. Patient verbalized understanding and agreed with plan. Will go to [facility] ED now.    Sarah Bang RN [facility] 10/16/2023 11:44 AM    Reason for Disposition   SEVERE pain (e.g., excruciating, unable to do any normal activities)    Additional Information   Negative: Shock suspected (e.g., cold/pale/clammy skin, too weak to stand, low BP, rapid pulse)   Negative: Similar pain previously and it was from 'heart attack'   Negative: Similar pain previously and it was from 'angina', and not relieved by nitroglycerin   Negative: Sounds like a life-threatening emergency to the triager   Negative: Chest pain   Negative: Followed an elbow injury   Negative: Wound looks infected   Negative: Difficulty breathing or unusual sweating (e.g., sweating without exertion)   Negative: Age > 40 and associated chest or jaw pain, and pain lasting > 5 minutes   Negative: Red area or streak and fever   Negative: Swollen joint and fever   Negative: Entire arm is swollen   Negative: Patient sounds very sick or weak to the triager    Protocols used: Elbow Pain-A-OH

## 2023-10-17 ENCOUNTER — PATIENT OUTREACH (OUTPATIENT)
Dept: CARE COORDINATION | Facility: CLINIC | Age: 43
End: 2023-10-17
Payer: COMMERCIAL

## 2023-10-17 NOTE — PROGRESS NOTES
Clinic Care Coordination Contact  Follow Up Progress Note      Assessment: The pt was recently in the ED, I called to check up on the pt and help the pt setup a ED follow up. The pt was at [facility] for elbow injury. I called the pt, but got his vm, so I left a vm for the pt to give me a call back.     Care Gaps:    Health Maintenance Due   Topic Date Due    YEARLY PREVENTIVE VISIT  Never done    ADVANCE CARE PLANNING  Never done    HEPATITIS B IMMUNIZATION (1 of 3 - 3-dose series) Never done    COVID-19 Vaccine (1) Never done    Pneumococcal Vaccine: Pediatrics (0 to 5 Years) and At-Risk Patients (6 to 64 Years) (2 - PCV) 03/25/2012    LIPID  Never done    ASTHMA ACTION PLAN  06/11/2019    INFLUENZA VACCINE (1) 09/01/2023           Care Plans      Intervention/Education provided during outreach:               Plan:     Care Coordinator will follow up in

## 2023-10-18 ENCOUNTER — PATIENT OUTREACH (OUTPATIENT)
Dept: CARE COORDINATION | Facility: CLINIC | Age: 43
End: 2023-10-18
Payer: COMMERCIAL

## 2023-10-18 NOTE — PROGRESS NOTES
Clinic Care Coordination Contact  Follow Up Progress Note      Assessment:  The pt was recently in the ED, I called to check up on the pt and help the pt setup a ED follow up. The pt was at [facility] for elbow injury. I called and talked to the pt, pt stated that he is ok. Pt stated that he wanted to wait, and if he feels that he needs a follow up, he will call.     Care Gaps:    Health Maintenance Due   Topic Date Due    YEARLY PREVENTIVE VISIT  Never done    ADVANCE CARE PLANNING  Never done    HEPATITIS B IMMUNIZATION (1 of 3 - 3-dose series) Never done    COVID-19 Vaccine (1) Never done    Pneumococcal Vaccine: Pediatrics (0 to 5 Years) and At-Risk Patients (6 to 64 Years) (2 - PCV) 03/25/2012    LIPID  Never done    ASTHMA ACTION PLAN  06/11/2019    INFLUENZA VACCINE (1) 09/01/2023           Care Plans      Intervention/Education provided during outreach:               Plan:     Care Coordinator will follow up in

## 2023-10-22 ENCOUNTER — NURSE TRIAGE (OUTPATIENT)
Dept: NURSING | Facility: CLINIC | Age: 43
End: 2023-10-22
Payer: COMMERCIAL

## 2023-10-23 ENCOUNTER — OFFICE VISIT (OUTPATIENT)
Dept: FAMILY MEDICINE | Facility: CLINIC | Age: 43
End: 2023-10-23
Payer: COMMERCIAL

## 2023-10-23 VITALS
BODY MASS INDEX: 24.63 KG/M2 | DIASTOLIC BLOOD PRESSURE: 109 MMHG | OXYGEN SATURATION: 98 % | TEMPERATURE: 97 F | SYSTOLIC BLOOD PRESSURE: 141 MMHG | WEIGHT: 162 LBS | RESPIRATION RATE: 20 BRPM | HEART RATE: 109 BPM

## 2023-10-23 DIAGNOSIS — L03.113 CELLULITIS OF RIGHT UPPER EXTREMITY: ICD-10-CM

## 2023-10-23 DIAGNOSIS — M70.21 OLECRANON BURSITIS, RIGHT ELBOW: Primary | ICD-10-CM

## 2023-10-23 DIAGNOSIS — J45.40 MODERATE PERSISTENT ASTHMA WITHOUT COMPLICATION: ICD-10-CM

## 2023-10-23 DIAGNOSIS — R03.0 ELEVATED BLOOD PRESSURE READING WITHOUT DIAGNOSIS OF HYPERTENSION: ICD-10-CM

## 2023-10-23 PROCEDURE — 99214 OFFICE O/P EST MOD 30 MIN: CPT | Performed by: FAMILY MEDICINE

## 2023-10-23 RX ORDER — ALBUTEROL SULFATE 0.83 MG/ML
2.5 SOLUTION RESPIRATORY (INHALATION) EVERY 6 HOURS PRN
Qty: 90 ML | Refills: 1 | Status: SHIPPED | OUTPATIENT
Start: 2023-10-23

## 2023-10-23 ASSESSMENT — ASTHMA QUESTIONNAIRES
ACT_TOTALSCORE: 15
QUESTION_3 LAST FOUR WEEKS HOW OFTEN DID YOUR ASTHMA SYMPTOMS (WHEEZING, COUGHING, SHORTNESS OF BREATH, CHEST TIGHTNESS OR PAIN) WAKE YOU UP AT NIGHT OR EARLIER THAN USUAL IN THE MORNING: ONCE A WEEK
QUESTION_4 LAST FOUR WEEKS HOW OFTEN HAVE YOU USED YOUR RESCUE INHALER OR NEBULIZER MEDICATION (SUCH AS ALBUTEROL): NOT AT ALL
QUESTION_2 LAST FOUR WEEKS HOW OFTEN HAVE YOU HAD SHORTNESS OF BREATH: ONCE A DAY
ACT_TOTALSCORE: 15
QUESTION_1 LAST FOUR WEEKS HOW MUCH OF THE TIME DID YOUR ASTHMA KEEP YOU FROM GETTING AS MUCH DONE AT WORK, SCHOOL OR AT HOME: SOME OF THE TIME
QUESTION_5 LAST FOUR WEEKS HOW WOULD YOU RATE YOUR ASTHMA CONTROL: POORLY CONTROLLED

## 2023-10-23 NOTE — PROGRESS NOTES
Assessment & Plan     Olecranon bursitis, right elbow  Cellulitis of right upper extremity  7 day history of swelling of the lateral elbow at the olecranon bursa initially per ED assessment on 10/16, now extended out into the forearm with significant warmth, redness, and tenderness to touch. Tachycardic. Afebrile. Concern for infected bursa with developing cellulitis vs. Infected joint.   - End of the day visit did not allow for aspiration on site. Patient advised to present to [facility] in [place] for further evaluation and aspiration.   - Begin amoxicillin-clavulanate (AUGMENTIN) 875-125 MG tablet; Take 1 tablet by mouth 2 times daily for 10 days  - Close follow up if not improving as expected.    Moderate persistent asthma without complication  Last seen for asthma in July 2022. On SMART therapy but feels that his current approach is inadequately controlling his symptoms. ACT of 15.   - Continue Symbicort at least BID - can also use PRN for SMART therapy.  - Add on albuterol (PROVENTIL) (2.5 MG/3ML) 0.083% neb solution; Take 1 vial (2.5 mg) by nebulization every 6 hours as needed for shortness of breath, wheezing or cough  - Nebulizer and Supplies Order for DME - ONLY FOR DME  - Close follow up to reassess breathing in 2 weeks.    Elevated blood pressure reading without diagnosis of hypertension  No history of hypertension. BP acutely elevated today. Asymptomatic. May be due to pain and anxiety related to his elbow.  - Follow up to reassess in 2 weeks.    I spent a total of 32 minutes on the day of the visit.   Time spent by me doing chart review, history and exam, documentation and further activities per the note     MED REC REQUIRED  Post Medication Reconciliation Status:  Discharge medications reconciled and changed, see notes/orders    Follow up as above    April JAY Hsu MD  [facility] AUDI Bradshaw is a 43 year old, presenting for the following  health issues:  Edema (Right arm), Hospital F/U ([facility]), and Refill Request (Albuterol solution and nebulizer machine)      LICHA Bradshaw presents for one week history of worsening right lateral elbow pain, swelling, and warmth. Just over one week ago, he hit the outside of his right elbow on the doorframe while walking down a yepez. He developed swelling and pain immediately. He sought care at the ED on 10/16 and had a normal xray. Felt to have traumatic bursitis. Initially, he had a discrete bump. Now it has spread out to involve his entire forearm and he is having more pain and discomfort with moving the joint. It has worsened substantially in the past 24 hours.     ROS: No fever/chills. Normal energy level and appetite. Some muscle cramps.     Would also like a refill for albuterol nebs and neb machine. He has been needing more relief for his shortness of breath and doesn't get it with the Symbicort. Does not feel acutely short of breath or wheezy today.         Objective    BP (!) 141/109   Pulse 109   Temp 97  F (36.1  C) (Tympanic)   Resp 20   Wt 73.5 kg (162 lb)   SpO2 98%   BMI 24.63 kg/m    Body mass index is 24.63 kg/m .  Physical Exam   GENERAL: healthy, alert and no distress  RESP: normal rate and effort  CV: slightly tachycardic, regular rhythm, peripheral pulses strong  MS: right arm with substantial redness, warmth, and swelling of the lateral elbow with nearly circumferential swelling and redness [facility] down the forearm. Some fluctuance noted over the olecranon bursa. Pain diffusely over this distribution. Unable to actively fully extend at the elbow joint.          7/14/2022     1:32 PM 7/13/2023     5:05 PM 10/23/2023     3:58 PM   ACT Total Scores   ACT TOTAL SCORE (Goal Greater than or Equal to 20) 15 11 15   In the past 12 months, how many times did you visit the emergency room for your asthma without being admitted to the hospital? 0 0 0   In the past 12 months, how many times were  you hospitalized overnight because of your asthma? 0 0 0

## 2023-10-23 NOTE — TELEPHONE ENCOUNTER
Patient was seen in ED on 10/16 for a elbow injury.  He had some imaging and was discharged saying to take ibuprofen for swelling.    Patients elbow is still painful, warm to touch, very stiff in morning, unable to sleep with the pain, cannot straightened it, swollen.    Patient denies numbness or tingling in fingers, no deformity.    Care advise: heat to injury site, OTC pain medication tylenol and ibuprofen.  No appointments in clinic will route.    Cherelle Estevez RN   10/22/23 8:03 PM  [facility] Nurse Advisor  Reason for Disposition   [1] MODERATE pain (e.g., interferes with normal activities) AND [2] high-risk adult (e.g., age > 60 years, osteoporosis, chronic steroid use)    Additional Information   Negative: Serious injury with multiple fractures (broken bones)   Negative: [1] Major bleeding (e.g., actively dripping or spurting) AND [2] can't be stopped   Negative: Bullet wound, stabbed by knife, or other serious penetrating wound   Negative: Sounds like a life-threatening emergency to the triager   Negative: Wound looks infected   Negative: Elbow pain from overuse (work, exercise, gardening) OR from self-induced lifting injury   Negative: Elbow pain not from an injury   Negative: Looks like a broken bone or dislocated joint (e.g., crooked or deformed)   Negative: Skin is split open or gaping (or length > 1/2 inch or 12 mm)   Negative: [1] Bleeding AND [2] won't stop after 10 minutes of direct pressure (using correct technique)   Negative: [1] Dirt in the wound AND [2] not removed with 15 minutes of scrubbing   Negative: Can't bend injured elbow at all   Negative: [1] Numbness (i.e., loss of sensation) in fingers AND [2] present now   Negative: Sounds like a serious injury to the triager   Negative: [1] SEVERE pain AND [2] not improved 2 hours after pain medicine/ice packs   Negative: Can't move injured elbow normally (i.e., bend or straighten completely)   Negative: Suspicious history for the  injury   Negative: Large swelling or bruise (> 2 inches or 5 cm)    Protocols used: Elbow Injury-A-AH

## 2023-10-23 NOTE — COMMUNITY RESOURCES LIST (ENGLISH)
10/23/2023   [facility]  N/A  For questions about this resource list or additional care needs, please contact your primary care clinic or care manager.  Phone: [phone]   Email: N/A   Address: [address] [place] [address]   Hours: N/A        Food and Nutrition       Food pantry  1  [employer] [facility] Distance: 0.6 miles      In-Person, Delivery, Pickup   [address] [place] [address]  Language: English, Tuvaluan  Hours: Mon - Fri 8:45 AM - 11:30 AM , Mon - Fri 1:00 PM - 3:30 PM  Fees: Free   Phone: (656) 239-7077 Email: info@[employer].org Website: http://www.In OvomnCelsias     2  [facility] [employer] Distance: 3.72 miles      Pickup   [address] [place] [address]  Language: Arabic, English, Hmong, Becka, Tuvaluan  Hours: Mon 1:00 PM - 4:00 PM , Mon 5:00 PM - 6:30 PM , Tue - Fri 9:00 AM - 11:30 AM , Tue - Fri 1:00 PM - 3:30 PM  Fees: Free   Phone: (439) 152-5162 Website: https://[facility] / [facility] Hospital[facility]oufun/     SNAP application assistance  3  [facility] Outreach Distance: 3.72 miles      Phone/Virtual   [address] [place] [address]  Language: Arabic, English, Hmong, Becka, Tuvaluan  Hours: Mon - Fri 10:00 AM - 12:00 PM , Mon - Fri 2:00 PM - 4:00 PM  Fees: Free   Phone: (888) 369-1045 Website: https://ZANK.mobi/     4  [facility] - [employer] (SNAP) Distance: 5.41 miles      Phone/Virtual   [address] [address] [place] [address]  Language: English, Hmong, Swiss, Portuguese, Tuvaluan, Tanzanian  Hours: Mon - Fri 9:00 AM - 5:00 PM  Fees: Free   Phone: (863) 135-6461 Website: https://[place].gov/dhs/people-we-serve/adults/economic-assistance/food-nutrition/programs-and-services/supplemental-nutrition-assistance-program.jsp     Soup kitchen or free meals  5  [facility] - [employer] Meals Distance: 0.52 miles      Pickup   [address] [place] [address]  Language: English  Hours: Mon 3:00 PM - 8:00 PM , Tue - Fri 5:00 AM - 8:00 PM , Sat 7:00 AM - 2:00 PM  Fees: Free   Phone: (286) 742-5837 Website: https://communityed.[facility].org/     6  [facility] Distance: 1.68 miles      In-Person, Pickup   [address] [place] [address]  Language: English  Hours: Mon - Thu 5:00 PM - 6:30 PM  Fees: Free   Phone: (325) 173-8240 Email: office@[place] CityZakada Website: http://[employer]ds/          Transportation       Free or low-cost transportation  7  [employer] [facility] [employer] Bus Distance: 2.02 miles      In-Person   [address] [place] [address]  Language: English  Hours: Tue 9:00 AM - 2:00 PM  Fees: Self Pay   Phone: (350) 791-1009 Email: info@[employer] Website: http://www.PayParade Pictures.org/     8  [employer] Distance: 5.62 miles      [address] [place] [address]  Language: English, South Korean, Hmong, Lithuanian, Malay  Hours: Mon - Sun 5:00 AM - 10:00 PM  Fees: Self Pay   Phone: (684) 872-4226 Email: maximalcare_mobility@[employer] Website: https://www.[facility].info/Providers/Maximal_Care_Mobility_LLC/Transportation/1?pos=9     Transportation to medical appointments  9  [facility] - Non-Emergency Medical Transportation Distance: 5.2 miles      In-Person   [address] [place] [address]  Language: English  Hours: Mon - Fri 8:00 AM - 4:00 PM Appt. Only  Fees: Self Pay   Phone: (670) 113-2379 Website: http://www.allTenKodhealth.org/Medical-Services/Emergency-medical-services/Non-emergency-transportation/     10  [employer] Distance: 5.62 miles      [address] [place] [address]  Language: English, South Korean, Hmong, Lithuanian, Malay  Hours: Mon - Sun 5:00 AM - 10:00 PM  Fees: Insurance, Self Pay   Phone: (728) 858-5824 Email: Own Productscare_mobility@[employer] Website:  https://www.[facility].info/Providers/Maximal_Care_Mobility_LLC/Transportation/1?pos=9          Important Numbers & Websites       Emergency Services   911  [facility] Services   [facility]  Poison Control   (775) 876-3994  Suicide Prevention Lifeline   (716) 415-1103 (TALK)  Child Abuse Hotline   (961) 230-7052 (4-A-Child)  Sexual Assault Hotline   (544) 813-4787 (HOPE)  National Runaway Safeline   (733) 924-7212 (RUNAWAY)  All-Options Talkline   (184) 532-2566  Substance Abuse Referral   (272) 598-9276 (HELP)

## 2023-10-23 NOTE — PATIENT INSTRUCTIONS
- Go to the [facility] in [place] to have them assess whether you need some fluid pulled off of your elbow joint. [address], [place] - it's just [place] [address]    - I sent in a prescription for an antibiotic to treat the infection. Check with the orthopedic team to make sure that they think this is the best antibiotic for you. If yes, take this twice per day for 10 days.     - Follow up in 2 weeks to reassess your blood pressure and breathing

## 2023-10-28 ENCOUNTER — NURSE TRIAGE (OUTPATIENT)
Dept: NURSING | Facility: CLINIC | Age: 43
End: 2023-10-28
Payer: COMMERCIAL

## 2023-10-28 NOTE — TELEPHONE ENCOUNTER
Nurse Triage SBAR    Is this a 2nd Level Triage? YES, LICENSED PRACTITIONER REVIEW IS REQUIRED    Situation: Per pt his right elbow  looks worse and feels worse.    Background:   Per pt/chart, pt was seen by provider Shadi, on 10/23/2023 for right elbow concern, where he was prescribed Augmentin. Per pt he was sent from visit with provider Shadi on same  day to [facility] in [place] for further evaluation and aspiration. Per pt, at [facility] he was prescribed Cephalexin 500 mg, to be taken as 1 capsule po q 8 hours for 14 days, which he  started on 10/23/2023 and he made provider Shadi aware of per pt. Per pt he has not taken the Augmentin prescribed by provider Shadi.    Assessment: Per pt he noticed a red bump with fluid in it to right elbow area shortly before calling FNA, that feels hard and feels warm.Pt denied any fevers or any other acute distress at this time.    Protocol Recommended Disposition:   Call PCP Now Pt was seen at [facility] in [place], where he was prescribed current antibiotic he is taking. Pt is informed to go to [facility] in [place], which is also open at this time for evaluation. Pt verbalized understanding and agreement with plan of care and no further questions at this time.    Recommendation:           Does the patient meet one of the following criteria for ADS visit consideration? 16+ years old, with an [facility] PCP     TIP  Providers, please consider if this condition is appropriate for management at one of our Acute and Diagnostic Services sites.     If patient is a good candidate, please use dotphrase <dot>triageresponse and select Refer to ADS to document.    Reason for Disposition   [1] Taking antibiotics > 24 hours AND [2] symptoms WORSE    Additional Information   Negative: SEVERE difficulty breathing (e.g., struggling for each breath, speaks in single words)   Negative: MODERATE difficulty breathing (e.g., speaks in  phrases, SOB even at rest, pulse 100-120)   Negative: Fever > 103 F (39.4 C)   Negative: Patient sounds very sick or weak to the triager    Protocols used: Infection on Antibiotic Follow-up Call-A-AH     Mercedes Flap Text: The defect edges were debeveled with a #15 scalpel blade.  Given the location of the defect, shape of the defect and the proximity to free margins a Mercedes flap was deemed most appropriate.  Using a sterile surgical marker, an appropriate advancement flap was drawn incorporating the defect and placing the expected incisions within the relaxed skin tension lines where possible. The area thus outlined was incised deep to adipose tissue with a #15 scalpel blade.  The skin margins were undermined to an appropriate distance in all directions utilizing iris scissors.

## 2023-11-04 ENCOUNTER — APPOINTMENT (OUTPATIENT)
Dept: RADIOLOGY | Facility: CLINIC | Age: 43
End: 2023-11-04
Attending: EMERGENCY MEDICINE
Payer: COMMERCIAL

## 2023-11-04 ENCOUNTER — HOSPITAL ENCOUNTER (EMERGENCY)
Facility: CLINIC | Age: 43
Discharge: HOME | End: 2023-11-04
Attending: EMERGENCY MEDICINE | Admitting: EMERGENCY MEDICINE
Payer: COMMERCIAL

## 2023-11-04 VITALS
SYSTOLIC BLOOD PRESSURE: 130 MMHG | OXYGEN SATURATION: 99 % | WEIGHT: 165 LBS | DIASTOLIC BLOOD PRESSURE: 77 MMHG | RESPIRATION RATE: 18 BRPM | BODY MASS INDEX: 24.44 KG/M2 | HEART RATE: 113 BPM | TEMPERATURE: 99 F | HEIGHT: 69 IN

## 2023-11-04 DIAGNOSIS — J21.0 RSV BRONCHIOLITIS: ICD-10-CM

## 2023-11-04 DIAGNOSIS — M70.21 OLECRANON BURSITIS OF RIGHT ELBOW: ICD-10-CM

## 2023-11-04 LAB
FLUAV RNA SPEC QL NAA+PROBE: NEGATIVE
FLUBV RNA RESP QL NAA+PROBE: NEGATIVE
RSV RNA SPEC NAA+PROBE: POSITIVE
SARS-COV-2 RNA RESP QL NAA+PROBE: NEGATIVE

## 2023-11-04 PROCEDURE — 99284 EMERGENCY DEPT VISIT MOD MDM: CPT

## 2023-11-04 PROCEDURE — 71046 X-RAY EXAM CHEST 2 VIEWS: CPT

## 2023-11-04 PROCEDURE — 87070 CULTURE OTHR SPECIMN AEROBIC: CPT | Performed by: EMERGENCY MEDICINE

## 2023-11-04 PROCEDURE — 250N000013 HC RX MED GY IP 250 OP 250 PS 637: Performed by: EMERGENCY MEDICINE

## 2023-11-04 PROCEDURE — 87637 SARSCOV2&INF A&B&RSV AMP PRB: CPT | Performed by: EMERGENCY MEDICINE

## 2023-11-04 PROCEDURE — 89050 BODY FLUID CELL COUNT: CPT | Performed by: EMERGENCY MEDICINE

## 2023-11-04 RX ORDER — OXYCODONE HYDROCHLORIDE 5 MG/1
5 TABLET ORAL ONCE
Status: COMPLETED | OUTPATIENT
Start: 2023-11-04 | End: 2023-11-04

## 2023-11-04 RX ADMIN — OXYCODONE HYDROCHLORIDE 5 MG: 5 TABLET ORAL at 20:30

## 2023-11-04 ASSESSMENT — ACTIVITIES OF DAILY LIVING (ADL)
ADLS_ACUITY_SCORE: 33
ADLS_ACUITY_SCORE: 35

## 2023-11-04 NOTE — ED TRIAGE NOTES
PT with infection in his right elbow and has been treated with antibiotics since 10/23. He was last in the ED at [facility] on 11/1. He says that the swelling and redness have gotten worse. PT also has hx of asthma and last night started coughing. Today he developed some SOB and used a neb at home that helped.

## 2023-11-05 NOTE — DISCHARGE INSTRUCTIONS
Continue with the antibiotics as prescribed  Follow-up with orthopedic hand specialty  You have RSV viral respiratory infection  Keep the compressive dressing for 3 days

## 2023-11-05 NOTE — ED NOTES
Provider made aware that the sample obtained from the right elbow was only sufficient for a blood culture.

## 2023-11-05 NOTE — ED NOTES
AVS discussed with pt. Education provided on worsening symptoms to watch out for, antibiotic regimen, and follow-up with PCP. All questions were answered. Vitally stable upon discharge.

## 2023-11-05 NOTE — ED PROVIDER NOTES
EMERGENCY DEPARTMENT ENCOUNTER            IMPRESSION:  RSV bronchiolitis  Right olecranon bursitis      MEDICAL DECISION MAKING:  It was my pleasure to provide care for Augustine Matthews who presented for evaluation of right elbow septic bursitis and symptoms of upper respiratory infection.  Patient has subacute right olecranon bursa swelling and erythema.  He also has symptoms of respiratory    On my exam patient is pleasant and cooperative.   Vital signs show tachycardia.  Physical exam notable for right elbow erythema and bursitis.  He has no respiratory distress and lungs are clear.     Pain administered for symptom relief.  Patient's symptoms improved.     Right olecranon bursa was aspirated.  1 cc of of cloudy fluid collected.  Specimen sent for wound culture.  The elbow with strep    Viral panel positive for RSV    Patient stable for discharge home.  He is to continue his prescribed antibiotics.  I have given him follow-up with orthopedic upper extremity surgery    Laboratory investigation independently interpreted and notable for olecranon bursitis and RSV    Chest imaging is negative.  Imaging independently interpreted by myself and does not show pneumonia    ED evaluation is consistent with RSV bronchiolitis and right elbow bursitis.      Patient was reevaluated and results were discussed.  I recommended discharge home, outpatient follow-up with upper orthopedic extremity and continue prescribed antibiotic      Prior to making a final disposition on this patient the results of patient's tests and other diagnostic studies were discussed with the patient. All questions were answered. Patient expressed understanding of the plan and was amenable.    Return precautions and follow-up were discussed.     =================================================================  CHIEF COMPLAINT:  Chief Complaint   Patient presents with    Joint Swelling    Shortness of Breath         HPI  Augustine Matthews is a 43 year  old male with a history of hernia repair, thrombocytopenia, thoracoscopic pleurodesis, asthma, and spontaneous pneumothorax, who presents to the ED by private car for evaluation of joint swelling and shortness of breath.    Per chart review, the patient presented to [facility] Emergency Department for evaluation of subjective infection in right elbow on 11/1/2023. No xray imaging done at ED visit. Recommended follow-up with outpatient orthopedics clinic within the next 1-2 days, was provided a referral. Advised to continue taking Keflex as prescribed. Patient was discharged with 800-160 mg of Bactrim DS.     Patient reports that he has an infection in his right elbow and has been taking Bactrim and Keflex regularly as prescribed, noting the antibiotics has been working. States he was advised to come to ER if he developed fever or chills. Reports that his right elbow has been in this state for a month now. Denies purulent draining from right elbow. Endorses shortness of breath and coughing that started last night. No other reported complaints or concerns at this time.    REVIEW OF SYSTEMS  Constitutional: Does not report chills, unintentional weight loss or fatigue   Eyes: Does not report visual changes or discharge    HENT: Does not report sore throat, ear pain or neck pain  Respiratory: Positive for cough and shortness of breath.  Cardiovascular: Does not report chest pain, palpitations or leg swelling  GI: Does not report abdominal pain, nausea, vomiting, or dark, bloody stools.    : Does not report hematuria, dysuria, or flank pain  Musculoskeletal: Does not report any new musculoskeletal pain or new muscle/joint pains  Skin: Does not report rash. Positive for infection on right elbow. Negative for drainage.  Neurologic: Does not report current headache, new weakness, focal weakness, or sensory changes        Remainder of systems reviewed, unless noted in HPI all others negative.      PAST MEDICAL  HISTORY:  Past Medical History:   Diagnosis Date    Uncomplicated asthma        PAST SURGICAL HISTORY:  Past Surgical History:   Procedure Laterality Date    ENT SURGERY      nose fiaxation, broken    HERNIA REPAIR      febuary/march 2018    INGUINAL HERNIA REPAIR      THORACOSCOPIC PLEURODESIS      ZZC REMOVAL PARIETAL PLEURA      Description: Parietal Pleurectomy;  Recorded: 02/17/2012;         CURRENT MEDICATIONS:    albuterol (PROVENTIL) (2.5 MG/3ML) 0.083% neb solution  budesonide-formoterol (SYMBICORT) 160-4.5 MCG/ACT Inhaler  HYDROcodone-acetaminophen (NORCO) 5-325 MG tablet  ibuprofen (ADVIL/MOTRIN) 800 MG tablet  lidocaine (XYLOCAINE) 5 % external ointment  naproxen (NAPROSYN) 500 MG tablet        ALLERGIES:  Allergies   Allergen Reactions    No Known Allergies        FAMILY HISTORY:  Family History   Problem Relation Age of Onset    Asthma Mother     Depression Mother     Lung Cancer Maternal Grandfather     Diabetes No family hx of     Hypertension No family hx of     No Known Problems Mother        SOCIAL HISTORY:   Social History     Socioeconomic History    Marital status: Single     Spouse name: None    Number of children: None    Years of education: None    Highest education level: None   Tobacco Use    Smoking status: Former    Smokeless tobacco: Never    Tobacco comments:     exposure to smokers   Substance and Sexual Activity    Alcohol use: No    Drug use: No     Social Determinants of Health     Financial Resource Strain: Low Risk  (10/23/2023)    Financial Resource Strain     Within the past 12 months, have you or your family members you live with been unable to get utilities (heat, electricity) when it was really needed?: No   Food Insecurity: High Risk (10/23/2023)    Food Insecurity     Within the past 12 months, did you worry that your food would run out before you got money to buy more?: Yes     Within the past 12 months, did the food you bought just not last and you didn t have money to  "get more?: Yes   Transportation Needs: High Risk (10/23/2023)    Transportation Needs     Within the past 12 months, has lack of transportation kept you from medical appointments, getting your medicines, non-medical meetings or appointments, work, or from getting things that you need?: Yes   Housing Stability: Low Risk  (10/23/2023)    Housing Stability     Do you have housing? : Yes     Are you worried about losing your housing?: No       PHYSICAL EXAM:    /77   Pulse 113   Temp 99  F (37.2  C) (Oral)   Resp 18   Ht 1.753 m (5' 9\")   Wt 74.8 kg (165 lb)   SpO2 99%   BMI 24.37 kg/m      Constitutional: Awake, alert, noticed  Head: Normocephalic, atraumatic.  ENT: Mucous membranes are moist.  No pallor.   Eyes: Pupils are reactive.  No discoloration.  Neck: No lymphadenopathy, no stridor, supple, no soft tissue swelling  Chest: No tenderness   Respiratory: Respirations even, unlabored. Lungs clear to ascultation bilaterally, in no acute respiratory distress.  Cardiovascular: Regular rate and rhythm.  Good overall perfusion.  Upper and lower extremity pulses are equal.  GI: Abdomen soft, non-tender to palpation.  No guarding or rebound. Bowel sounds present throughout.   Back: No CVA tenderness.    Musculoskeletal: Right elbow has 3 cm area of erythema and tenderness over the olecranon bursa.  Joint has full range of motion.  There is no joint effusion  Integument: Warm, dry. No rash. No bruising or petechiae.  Neurologic: Alert & oriented x 3. Normal speech.   Psychiatric: Normal mood and affect.  Appropriate judgement.    ED COURSE:  8:08 PM Met with the patient and performed my initial exam.  9:27 PM Updated patient on lab and imaging results.      Medical Decision Making    History:  Supplemental history from: Family, care center, paramedics: Patient  External Record(s) reviewed: External medical records including care everywhere reviewed: ED visit to [facility] ED for subjective infection in " right elbow on 11/1/2023.    Work Up:  EKG, laboratory and imaging studies as ordered were independently interpreted by myself.   Broad differential diagnosis considered for respiratory infection and joint  The patient's presentation was of high complexity.     Complicating factors:  Patient has a complicated past medical history including: Thrombocytopenia, thoracoscopic pleurodesis, asthma, and spontaneous pneumothorax.  Care affected by social determinants of health: Access to primary care    Disposition involved shared decision-making with the patient.    Patient otherwise to continue outpatient medications as prescribed.       LAB:  Laboratory results were independently reviewed and interpreted  Results for orders placed or performed during the hospital encounter of 11/04/23   XR Chest 2 Views    Impression    IMPRESSION: Negative chest.   Symptomatic Influenza A/B, RSV, & SARS-CoV2 PCR (COVID-19) Nasopharyngeal    Specimen: Nasopharyngeal; Swab   Result Value Ref Range    Influenza A PCR Negative Negative    Influenza B PCR Negative Negative    RSV PCR Positive (A) Negative    SARS CoV2 PCR Negative Negative         RADIOLOGY:  Radiology reports were independently reviewed and interpreted  XR Chest 2 Views   Final Result   IMPRESSION: Negative chest.             PROCEDURES:   Right olecranon bursa aspiration.  The wound was prepped and draped in routine sterile fashion.  Topical anesthesia was administered.  1 cc of cloudy fluid was drained with an 18-gauge needle.  Sterile dressing applied patient tolerated the procedure well        MEDICATIONS GIVEN IN THE EMERGENCY:  Medications   oxyCODONE (ROXICODONE) tablet 5 mg (5 mg Oral $Given 11/4/23 2030)           NEW PRESCRIPTIONS STARTED AT TODAY'S ER VISIT:  Discharge Medication List as of 11/4/2023  9:29 PM                   FINAL DIAGNOSIS:    ICD-10-CM    1. Olecranon bursitis of right elbow  M70.21       2. RSV bronchiolitis  J21.0                  NAME:  Augustine Matthews  AGE: 43 year old male  YOB: 1980  MRN: [id number]  EVALUATION DATE & TIME: 11/4/2023  6:48 PM    PCP: Andre Daley    ED PROVIDER: Miguel Marion M.D.      IFaiza, am serving as a scribe to document services personally performed by Dr. Miguel Marion based on my observation and the provider's statements to me. I, Miguel Marion MD attest that Faiza Christian is acting in a scribe capacity, has observed my performance of the services and has documented them in accordance with my direction.    Miguel Marion M.D.  Emergency Medicine  [facility] EMERGENCY ROOM  [facility] [facility] [address]  [phone]  Dept: [phone]  11/4/2023         Miguel Marion MD  11/04/23 [id number]

## 2023-11-07 ENCOUNTER — PATIENT OUTREACH (OUTPATIENT)
Dept: CARE COORDINATION | Facility: CLINIC | Age: 43
End: 2023-11-07
Payer: COMMERCIAL

## 2023-11-07 NOTE — PROGRESS NOTES
Clinic Care Coordination Contact  Follow Up Progress Note      Assessment:   The pt was recently in the ED, I called to check up on the pt, and help the pt setup a ED follow up. The pt was at [facility] for joint swelling, shortness of breath. I called the pt, but got his vm, so I left a vm for the pt to give me a call back.     Care Gaps:    Health Maintenance Due   Topic Date Due    YEARLY PREVENTIVE VISIT  Never done    ADVANCE CARE PLANNING  Never done    HEPATITIS B IMMUNIZATION (1 of 3 - 3-dose series) Never done    COVID-19 Vaccine (1) Never done    Pneumococcal Vaccine: Pediatrics (0 to 5 Years) and At-Risk Patients (6 to 64 Years) (2 - PCV) 03/25/2012    LIPID  Never done    ASTHMA ACTION PLAN  06/11/2019    INFLUENZA VACCINE (1) 09/01/2023           Care Plans      Intervention/Education provided during outreach:               Plan:     Care Coordinator will follow up in

## 2023-11-08 ENCOUNTER — PATIENT OUTREACH (OUTPATIENT)
Dept: CARE COORDINATION | Facility: CLINIC | Age: 43
End: 2023-11-08

## 2023-11-08 NOTE — PROGRESS NOTES
Clinic Care Coordination Contact  Follow Up Progress Note      Assessment:    The pt was recently in the ED, I called to check up on the pt, and help the pt setup a ED follow up. The pt was at [facility] for joint swelling, shortness of breath. I called the pt, but got his vm, so I left a vm for the pt to give me a call back.      Care Gaps:    Health Maintenance Due   Topic Date Due    YEARLY PREVENTIVE VISIT  Never done    ADVANCE CARE PLANNING  Never done    HEPATITIS B IMMUNIZATION (1 of 3 - 3-dose series) Never done    COVID-19 Vaccine (1) Never done    Pneumococcal Vaccine: Pediatrics (0 to 5 Years) and At-Risk Patients (6 to 64 Years) (2 - PCV) 03/25/2012    LIPID  Never done    ASTHMA ACTION PLAN  06/11/2019    INFLUENZA VACCINE (1) 09/01/2023           Care Plans      Intervention/Education provided during outreach:               Plan:     Care Coordinator will follow up in

## 2023-11-09 ENCOUNTER — PATIENT OUTREACH (OUTPATIENT)
Dept: CARE COORDINATION | Facility: CLINIC | Age: 43
End: 2023-11-09
Payer: COMMERCIAL

## 2023-11-09 NOTE — PROGRESS NOTES
Clinic Care Coordination Contact  Follow Up Progress Note      Assessment:  The pt was recently in the ED, I called to check up on the pt, and help the pt setup a ED follow up. The pt was at [facility] for joint swelling, shortness of breath. I called the pt, but got his vm, so I left a vm for the pt to give me a call back.       Care Gaps:    Health Maintenance Due   Topic Date Due    YEARLY PREVENTIVE VISIT  Never done    ADVANCE CARE PLANNING  Never done    HEPATITIS B IMMUNIZATION (1 of 3 - 3-dose series) Never done    COVID-19 Vaccine (1) Never done    Pneumococcal Vaccine: Pediatrics (0 to 5 Years) and At-Risk Patients (6 to 64 Years) (2 - PCV) 03/25/2012    LIPID  Never done    ASTHMA ACTION PLAN  06/11/2019    INFLUENZA VACCINE (1) 09/01/2023           Care Plans      Intervention/Education provided during outreach:               Plan:     Care Coordinator will follow up in

## 2023-11-10 LAB — BACTERIA ASPIRATE CULT: NO GROWTH

## 2024-01-16 DIAGNOSIS — M79.10 MUSCLE PAIN: ICD-10-CM

## 2024-01-16 DIAGNOSIS — J45.40 MODERATE PERSISTENT ASTHMA WITHOUT COMPLICATION: ICD-10-CM

## 2024-01-18 RX ORDER — BUDESONIDE AND FORMOTEROL FUMARATE DIHYDRATE 160; 4.5 UG/1; UG/1
AEROSOL RESPIRATORY (INHALATION)
Qty: 20.4 G | Refills: 1 | Status: SHIPPED | OUTPATIENT
Start: 2024-01-18

## 2024-01-18 RX ORDER — IBUPROFEN 800 MG/1
TABLET, FILM COATED ORAL
Qty: 100 TABLET | Refills: 0 | Status: SHIPPED | OUTPATIENT
Start: 2024-01-18

## 2024-06-24 ENCOUNTER — PATIENT OUTREACH (OUTPATIENT)
Dept: CARE COORDINATION | Facility: CLINIC | Age: 44
End: 2024-06-24
Payer: COMMERCIAL

## 2024-06-24 NOTE — PROGRESS NOTES
Clinic Care Coordination Contact  Follow Up Progress Note      Assessment:  The pt was recently in the ED, I called to check up on the pt and help the pt setup a ED follow up. I called the pt, but got his phone was not available.     Care Gaps:    Health Maintenance Due   Topic Date Due    YEARLY PREVENTIVE VISIT  Never done    ADVANCE CARE PLANNING  Never done    HEPATITIS B IMMUNIZATION (1 of 3 - 19+ 3-dose series) Never done    Pneumococcal Vaccine: Pediatrics (0 to 5 Years) and At-Risk Patients (6 to 64 Years) (2 of 2 - PCV) 03/25/2012    ASTHMA ACTION PLAN  06/11/2019    LIPID  Never done    GLUCOSE  01/03/2023    COVID-19 Vaccine (1 - 2023-24 season) Never done    PHQ-2 (once per calendar year)  01/01/2024    ASTHMA CONTROL TEST  04/23/2024           Care Plans      Intervention/Education provided during outreach:               Plan:     Care Coordinator will follow up in

## 2024-06-25 ENCOUNTER — PATIENT OUTREACH (OUTPATIENT)
Dept: CARE COORDINATION | Facility: CLINIC | Age: 44
End: 2024-06-25
Payer: COMMERCIAL

## 2024-06-25 NOTE — PROGRESS NOTES
Clinic Care Coordination Contact  Follow Up Progress Note      Assessment: The pt was recently in the ED, I called to check up on the pt and help the pt setup a ED follow up. I called the pt,someone answered but stated that it was the wrong number.     Care Gaps:    Health Maintenance Due   Topic Date Due    YEARLY PREVENTIVE VISIT  Never done    ADVANCE CARE PLANNING  Never done    HEPATITIS B IMMUNIZATION (1 of 3 - 19+ 3-dose series) Never done    Pneumococcal Vaccine: Pediatrics (0 to 5 Years) and At-Risk Patients (6 to 64 Years) (2 of 2 - PCV) 03/25/2012    ASTHMA ACTION PLAN  06/11/2019    LIPID  Never done    GLUCOSE  01/03/2023    COVID-19 Vaccine (1 - 2023-24 season) Never done    PHQ-2 (once per calendar year)  01/01/2024    ASTHMA CONTROL TEST  04/23/2024           Care Plans      Intervention/Education provided during outreach:               Plan:     Care Coordinator will follow up in

## 2024-06-26 ENCOUNTER — PATIENT OUTREACH (OUTPATIENT)
Dept: CARE COORDINATION | Facility: CLINIC | Age: 44
End: 2024-06-26
Payer: COMMERCIAL

## 2024-07-04 ENCOUNTER — HOSPITAL ENCOUNTER (EMERGENCY)
Facility: HOSPITAL | Age: 44
Discharge: HOME | End: 2024-07-04
Attending: STUDENT IN AN ORGANIZED HEALTH CARE EDUCATION/TRAINING PROGRAM | Admitting: STUDENT IN AN ORGANIZED HEALTH CARE EDUCATION/TRAINING PROGRAM
Payer: COMMERCIAL

## 2024-07-04 VITALS
SYSTOLIC BLOOD PRESSURE: 151 MMHG | RESPIRATION RATE: 18 BRPM | TEMPERATURE: 98 F | OXYGEN SATURATION: 98 % | DIASTOLIC BLOOD PRESSURE: 103 MMHG | HEART RATE: 116 BPM | HEIGHT: 69 IN | WEIGHT: 150 LBS | BODY MASS INDEX: 22.22 KG/M2

## 2024-07-04 DIAGNOSIS — T16.1XXA FOREIGN BODY OF RIGHT EAR, INITIAL ENCOUNTER: ICD-10-CM

## 2024-07-04 PROCEDURE — 99283 EMERGENCY DEPT VISIT LOW MDM: CPT

## 2024-07-04 RX ORDER — CIPROFLOXACIN AND DEXAMETHASONE 3; 1 MG/ML; MG/ML
4 SUSPENSION/ DROPS AURICULAR (OTIC) 2 TIMES DAILY
Qty: 7.5 ML | Refills: 0 | Status: SHIPPED | OUTPATIENT
Start: 2024-07-04 | End: 2024-07-11

## 2024-07-04 ASSESSMENT — COLUMBIA-SUICIDE SEVERITY RATING SCALE - C-SSRS
2. HAVE YOU ACTUALLY HAD ANY THOUGHTS OF KILLING YOURSELF IN THE PAST MONTH?: NO
6. HAVE YOU EVER DONE ANYTHING, STARTED TO DO ANYTHING, OR PREPARED TO DO ANYTHING TO END YOUR LIFE?: NO
1. IN THE PAST MONTH, HAVE YOU WISHED YOU WERE DEAD OR WISHED YOU COULD GO TO SLEEP AND NOT WAKE UP?: NO

## 2024-07-04 ASSESSMENT — ACTIVITIES OF DAILY LIVING (ADL): ADLS_ACUITY_SCORE: 33

## 2024-07-04 NOTE — ED TRIAGE NOTES
Augustine arrives to triage by [facility] from [facility]. Pt reports swimming a couple weeks ago and got an ear plug stuck in his R ear. Reports that he was seen at [facility] and was scheduled to follow-up with ENT but missed that appt. Today is having increased pain and decreased hearing. Pt talking to self in triage.

## 2024-07-04 NOTE — ED PROVIDER NOTES
Emergency Department Encounter         FINAL IMPRESSION:  Ear pain        ED COURSE AND MEDICAL DECISION MAKING       ED Course as of 07/04/24 0134   Thu Jul 04, 2024   0132 44-year-old male here with right ear pain.  Patient was diagnosed with a foreign body in the ear on 22 June.  Was supposed to follow-up with ENT however his phone malfunctioned and he never received a call.  Here now with worsening pain.  No fevers chills nausea vomiting or any systemic symptoms.  No facial swelling.  No trismus stridor or drooling.  On arrival he looks well.  Vitals are stable.  On examination he has normal external auditory canal with the naked eye, his auricle is normal.  No mastoiditis.  No facial swelling or redness.  The ears otherwise normal.  Patient on otoscope examination has a large amount of foreign material and what appears to be serosanguineous discharge.  I do not feel comfortable digging out patient's ear as we failed already at an outside hospital.  Will send home Ciprodex and discharged home with urgent ENT follow-up       Additional ED Course Timeline:  1:18 AM I met with the patient, obtained history, performed an initial exam, and discussed options and plan for diagnostics and treatment here in the ED.                        Medical Decision Making  Obtained supplemental history:Supplemental history obtained?: No  Reviewed external records: External records reviewed?: Documented in chart  Care impacted by chronic illness:Chronic Lung Disease  Care significantly affected by social determinants of health:Access to Medical Care  Did you consider but not order tests?: Work up considered but not performed and documented in chart, if applicable  Did you interpret images independently?: Independent interpretation of ECG and images noted in documentation, when applicable.  Consultation discussion with other provider:Did you involve another provider (consultant, , pharmacy, etc.)?: No  Discharge. I prescribed  additional prescription strength medication(s) as charted. See documentation for any additional details.              Critical Care     Performed by: Christiano Ricketts or    Authorized by: Christiano Ricketts  Total critical care time:  minutes  Critical care was necessary to treat or prevent imminent or life-threatening deterioration of the following conditions:   Critical care was time spent personally by me on the following activities: development of treatment plan with patient or surrogate, discussions with consultants, examination of patient, evaluation of patient's response to treatment, obtaining history from patient or surrogate, ordering and performing treatments and interventions, ordering and review of laboratory studies, ordering and review of radiographic studies, re-evaluation of patient's condition and monitoring for potential decompensation.  Critical care time was exclusive of separately billable procedures and treating other patients.'    At the conclusion of the encounter I discussed the results of all the tests and the disposition. The questions were answered. The patient or family acknowledged understanding and was agreeable with the care plan.                  MEDICATIONS GIVEN IN THE EMERGENCY DEPARTMENT:  Medications - No data to display    NEW PRESCRIPTIONS STARTED AT TODAY'S ED VISIT:  New Prescriptions    No medications on file       HPI     Patient information obtained from: The patient    Use of : N/A     Augustine Matthews is a 44 year old male with a pertinent history uncomplicated asthma who presents to this ED via walk-in for evaluation of foreign body in ear.    The patient reports he went swimming a couple of weeks ago and had a silicone ear plug stuck in his right ear. He presented to the ER at that time and had it partially removed and was told to follow-up with ENT, but he missed the appointment. He has been using his debrox ear drops. He is now complaining of increasing pain, swelling to  "his right ear and right jaw.      REVIEW OF SYSTEMS:  Review of Systems   Constitutional: Negative for fever, malaise  HEENT: Negative runny nose, sore throat, neck pain. Positive for foreign body stuck in right ear with associated right ear pain, right ear swelling, and right jaw pain  Respiratory: Negative for shortness of breath, cough, congestion  Cardiovascular: Negative for chest pain, leg edema  Gastrointestinal: Negative for abdominal distention, abdominal pain, constipation, vomiting, nausea, diarrhea  Genitourinary: Negative for dysuria and hematuria.   Integument: Negative for rash, skin breakdown  Neurological: Negative for paresthesias, weakness, headache.  Musculoskeletal: Negative for joint pain, joint swelling      All other systems reviewed and are negative.          MEDICAL HISTORY     Past Medical History:   Diagnosis Date    Uncomplicated asthma        Past Surgical History:   Procedure Laterality Date    ENT SURGERY      nose fiaxation, broken    HERNIA REPAIR      febuary/march 2018    INGUINAL HERNIA REPAIR      THORACOSCOPIC PLEURODESIS      ZZC REMOVAL PARIETAL PLEURA      Description: Parietal Pleurectomy;  Recorded: 02/17/2012;       Social History     Tobacco Use    Smoking status: Former    Smokeless tobacco: Never    Tobacco comments:     exposure to smokers   Substance Use Topics    Alcohol use: No    Drug use: No       albuterol (PROVENTIL) (2.5 MG/3ML) 0.083% neb solution  budesonide-formoterol (SYMBICORT) 160-4.5 MCG/ACT Inhaler  HYDROcodone-acetaminophen (NORCO) 5-325 MG tablet  ibuprofen (ADVIL/MOTRIN) 800 MG tablet  lidocaine (XYLOCAINE) 5 % external ointment  naproxen (NAPROSYN) 500 MG tablet            PHYSICAL EXAM     BP (!) 151/103   Pulse 116   Temp 98  F (36.7  C) (Oral)   Resp 18   Ht 1.753 m (5' 9\")   Wt 68 kg (150 lb)   SpO2 98%   BMI 22.15 kg/m        PHYSICAL EXAM:     General: Patient appears well, nontoxic, comfortable  HEENT: Moist mucous membranes,  No head " trauma.  Right TM is obscured by white material with a small amount of serosanguineous discharge.  No mastoiditis.  No auricular redness or facial cellulitis.  No trismus  Neurological: Alert and oriented, grossly neurologically intact.  Psychological: Normal affect and mood.  Integument: No rashes appreciated          RESULTS       Labs Ordered and Resulted from Time of ED Arrival to Time of ED Departure - No data to display    No orders to display                     PROCEDURES:  Procedures:  Procedures       IDylon am serving as a scribe to document services personally performed by Christiano Ricketts DO, based on my observations and the provider's statements to me.  I, Christiano Ricketts DO, attest that Dylon Rushing is acting in a scribe capacity, has observed my performance of the services and has documented them in accordance with my direction.    Christiano Ricketts DO  Emergency Medicine  [facility] EMERGENCY DEPARTMENT       Christiano Ricketts DO  07/04/24 0732

## 2024-07-04 NOTE — DISCHARGE INSTRUCTIONS
Please take eardrops as prescribed.    Please take 500mg of tylenol every 4 hours as well as 600mg of ibuprofen every 6 hours for pain. Do not take more than 4,000mg of tylenol in 24hrs.    Return if your face startS swelling, your ear turns red, worsening pain, fevers or any other concerning symptoms

## 2024-07-08 ENCOUNTER — TELEPHONE (OUTPATIENT)
Dept: OTOLARYNGOLOGY | Facility: CLINIC | Age: 44
End: 2024-07-08
Payer: COMMERCIAL

## 2024-07-08 ENCOUNTER — PATIENT OUTREACH (OUTPATIENT)
Dept: CARE COORDINATION | Facility: CLINIC | Age: 44
End: 2024-07-08
Payer: COMMERCIAL

## 2024-07-08 NOTE — TELEPHONE ENCOUNTER
This encounter is being sent to inform the clinic that this patient has a referral from [facility] for the diagnoses of     T16.1XXA (ICD-10-CM) - Foreign body of right ear, initial encounter    and has requested that this patient be seen within 1-2 days and/or with unknown.  Based on the availability of our provider(s), we are unable to accommodate this request.    Were all sites offered this patient?  Yes    Does scheduling algorithm request to schedule next available?  Patient has been scheduled for the first available opening with Dr Franklin on 11/1/24.  We have informed the patient that the clinic will review their referral and reach out if a sooner appointment is medically necessary.

## 2024-07-08 NOTE — PROGRESS NOTES
Clinic Care Coordination Contact  Follow Up Progress Note      Assessment: The pt was recently in the ED, I called to check up on the pt and help the pt setup a ED follow up. The pt was at [facility] for foreign body in ear. I called the pt,but got his vm, so I left a vm for the pt to give me a call back.     Care Gaps:    Health Maintenance Due   Topic Date Due    YEARLY PREVENTIVE VISIT  Never done    ADVANCE CARE PLANNING  Never done    HEPATITIS B IMMUNIZATION (1 of 3 - 19+ 3-dose series) Never done    Pneumococcal Vaccine: Pediatrics (0 to 5 Years) and At-Risk Patients (6 to 64 Years) (2 of 2 - PCV) 03/25/2012    ASTHMA ACTION PLAN  06/11/2019    LIPID  Never done    GLUCOSE  01/03/2023    COVID-19 Vaccine (1 - 2023-24 season) Never done    PHQ-2 (once per calendar year)  01/01/2024    ASTHMA CONTROL TEST  04/23/2024

## 2024-07-08 NOTE — TELEPHONE ENCOUNTER
Spoke to patient, rescheduled appointment for Thursday this week for wax ear plug removal from ear.   Lilibeth Ireland RN on 7/8/2024 at 9:59 AM

## 2024-07-09 ENCOUNTER — PATIENT OUTREACH (OUTPATIENT)
Dept: CARE COORDINATION | Facility: CLINIC | Age: 44
End: 2024-07-09
Payer: COMMERCIAL

## 2024-07-09 NOTE — PROGRESS NOTES
Clinic Care Coordination Contact  Follow Up Progress Note      Assessment: The pt was recently in the ED, I called to check up on the pt and help the pt setup a ED follow up. The pt was at [facility] for foreign body in ear. I called the pt,but got his vm, so I left a vm for the pt to give me a call back.        Care Gaps:    Health Maintenance Due   Topic Date Due    YEARLY PREVENTIVE VISIT  Never done    ADVANCE CARE PLANNING  Never done    HEPATITIS B IMMUNIZATION (1 of 3 - 19+ 3-dose series) Never done    Pneumococcal Vaccine: Pediatrics (0 to 5 Years) and At-Risk Patients (6 to 64 Years) (2 of 2 - PCV) 03/25/2012    ASTHMA ACTION PLAN  06/11/2019    LIPID  Never done    GLUCOSE  01/03/2023    COVID-19 Vaccine (1 - 2023-24 season) Never done    PHQ-2 (once per calendar year)  01/01/2024    ASTHMA CONTROL TEST  04/23/2024

## 2024-07-11 ENCOUNTER — OFFICE VISIT (OUTPATIENT)
Dept: OTOLARYNGOLOGY | Facility: CLINIC | Age: 44
End: 2024-07-11
Attending: STUDENT IN AN ORGANIZED HEALTH CARE EDUCATION/TRAINING PROGRAM
Payer: COMMERCIAL

## 2024-07-11 DIAGNOSIS — H92.11 OTORRHEA, RIGHT: Primary | ICD-10-CM

## 2024-07-11 DIAGNOSIS — T16.1XXA FOREIGN BODY OF RIGHT EAR, INITIAL ENCOUNTER: ICD-10-CM

## 2024-07-11 PROCEDURE — 99204 OFFICE O/P NEW MOD 45 MIN: CPT | Mod: 25 | Performed by: OTOLARYNGOLOGY

## 2024-07-11 PROCEDURE — 92504 EAR MICROSCOPY EXAMINATION: CPT | Performed by: OTOLARYNGOLOGY

## 2024-07-11 RX ORDER — CIPROFLOXACIN AND DEXAMETHASONE 3; 1 MG/ML; MG/ML
SUSPENSION/ DROPS AURICULAR (OTIC)
Qty: 10 ML | Refills: 0 | Status: SHIPPED | OUTPATIENT
Start: 2024-07-11 | End: 2024-09-22
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
## 2024-08-12 DIAGNOSIS — G89.29 CHRONIC MIDLINE LOW BACK PAIN WITHOUT SCIATICA: ICD-10-CM
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
## 2024-08-12 DIAGNOSIS — M54.50 CHRONIC MIDLINE LOW BACK PAIN WITHOUT SCIATICA: ICD-10-CM

## 2024-08-12 RX ORDER — LIDOCAINE 50 MG/G
OINTMENT TOPICAL DAILY PRN
Qty: 240 G | Refills: 1 | Status: SHIPPED | OUTPATIENT
Start: 2024-08-12

## 2024-08-12 NOTE — TELEPHONE ENCOUNTER
"Message to physician:     Date of last visit: 10/23/2023    Date of next visit if scheduled: NONE    Potassium   Date Value Ref Range Status   01/03/2020 3.8 3.5 - 5.0 mmol/L Final   06/14/2012 4.1 3.4 - 5.3 mmol/L Final     Creatinine   Date Value Ref Range Status   01/03/2020 1.12 0.70 - 1.30 mg/dL Final   06/14/2012 1.1 0.8 - 1.5 mg/dL Final     GFR Estimate   Date Value Ref Range Status   01/03/2020 >60 >60 mL/min/1.73m2 Final       BP Readings from Last 3 Encounters:   07/04/24 (!) 151/103   11/04/23 130/77   10/23/23 (!) 141/109       No results found for: \"A1C\"    Please complete refill and CLOSE ENCOUNTER.  Closing the encounter signifies the refill is complete.   "

## 2024-09-22 ENCOUNTER — HOSPITAL ENCOUNTER (INPATIENT)
Facility: CLINIC | Age: 44
LOS: 3 days | Discharge: HOME | DRG: 872 | End: 2024-09-25
Attending: EMERGENCY MEDICINE | Admitting: STUDENT IN AN ORGANIZED HEALTH CARE EDUCATION/TRAINING PROGRAM
Payer: COMMERCIAL

## 2024-09-22 ENCOUNTER — APPOINTMENT (OUTPATIENT)
Dept: RADIOLOGY | Facility: CLINIC | Age: 44
DRG: 872 | End: 2024-09-22
Payer: COMMERCIAL

## 2024-09-22 DIAGNOSIS — M71.10 SEPTIC BURSITIS: ICD-10-CM

## 2024-09-22 LAB
ANION GAP SERPL CALCULATED.3IONS-SCNC: 16 MMOL/L (ref 7–15)
BASOPHILS # BLD MANUAL: 0 10E3/UL (ref 0–0.2)
BASOPHILS NFR BLD MANUAL: 0 %
BUN SERPL-MCNC: 11.8 MG/DL (ref 6–20)
CALCIUM SERPL-MCNC: 9.4 MG/DL (ref 8.8–10.4)
CHLORIDE SERPL-SCNC: 98 MMOL/L (ref 98–107)
CREAT SERPL-MCNC: 1.01 MG/DL (ref 0.67–1.17)
CRP SERPL-MCNC: 169 MG/L
EGFRCR SERPLBLD CKD-EPI 2021: >90 ML/MIN/1.73M2
EOSINOPHIL # BLD MANUAL: 0 10E3/UL (ref 0–0.7)
EOSINOPHIL NFR BLD MANUAL: 0 %
ERYTHROCYTE [DISTWIDTH] IN BLOOD BY AUTOMATED COUNT: 12.4 % (ref 10–15)
ERYTHROCYTE [SEDIMENTATION RATE] IN BLOOD BY WESTERGREN METHOD: 10 MM/HR (ref 0–15)
GLUCOSE SERPL-MCNC: 102 MG/DL (ref 70–99)
HCO3 SERPL-SCNC: 20 MMOL/L (ref 22–29)
HCT VFR BLD AUTO: 46.8 % (ref 40–53)
HGB BLD-MCNC: 17 G/DL (ref 13.3–17.7)
LACTATE SERPL-SCNC: 1.7 MMOL/L (ref 0.7–2)
LYMPHOCYTES # BLD MANUAL: 1.4 10E3/UL (ref 0.8–5.3)
LYMPHOCYTES NFR BLD MANUAL: 6 %
MCH RBC QN AUTO: 31.8 PG (ref 26.5–33)
MCHC RBC AUTO-ENTMCNC: 36.3 G/DL (ref 31.5–36.5)
MCV RBC AUTO: 88 FL (ref 78–100)
MONOCYTES # BLD MANUAL: 1.2 10E3/UL (ref 0–1.3)
MONOCYTES NFR BLD MANUAL: 5 %
NEUTROPHILS # BLD MANUAL: 20.6 10E3/UL (ref 1.6–8.3)
NEUTROPHILS NFR BLD MANUAL: 89 %
NRBC # BLD AUTO: 0 10E3/UL
NRBC BLD AUTO-RTO: 0 /100
PLAT MORPH BLD: ABNORMAL
PLATELET # BLD AUTO: 122 10E3/UL (ref 150–450)
POTASSIUM SERPL-SCNC: 4.3 MMOL/L (ref 3.4–5.3)
RBC # BLD AUTO: 5.35 10E6/UL (ref 4.4–5.9)
RBC MORPH BLD: ABNORMAL
SODIUM SERPL-SCNC: 134 MMOL/L (ref 135–145)
VARIANT LYMPHS BLD QL SMEAR: PRESENT
WBC # BLD AUTO: 23.1 10E3/UL (ref 4–11)

## 2024-09-22 PROCEDURE — 80048 BASIC METABOLIC PNL TOTAL CA: CPT

## 2024-09-22 PROCEDURE — 85007 BL SMEAR W/DIFF WBC COUNT: CPT

## 2024-09-22 PROCEDURE — 85652 RBC SED RATE AUTOMATED: CPT

## 2024-09-22 PROCEDURE — 258N000003 HC RX IP 258 OP 636: Performed by: EMERGENCY MEDICINE

## 2024-09-22 PROCEDURE — 83605 ASSAY OF LACTIC ACID: CPT

## 2024-09-22 PROCEDURE — 258N000003 HC RX IP 258 OP 636

## 2024-09-22 PROCEDURE — 250N000013 HC RX MED GY IP 250 OP 250 PS 637

## 2024-09-22 PROCEDURE — 36415 COLL VENOUS BLD VENIPUNCTURE: CPT

## 2024-09-22 PROCEDURE — 87040 BLOOD CULTURE FOR BACTERIA: CPT

## 2024-09-22 PROCEDURE — 250N000013 HC RX MED GY IP 250 OP 250 PS 637: Performed by: STUDENT IN AN ORGANIZED HEALTH CARE EDUCATION/TRAINING PROGRAM

## 2024-09-22 PROCEDURE — 73562 X-RAY EXAM OF KNEE 3: CPT | Mod: LT

## 2024-09-22 PROCEDURE — 96374 THER/PROPH/DIAG INJ IV PUSH: CPT

## 2024-09-22 PROCEDURE — 85027 COMPLETE CBC AUTOMATED: CPT

## 2024-09-22 PROCEDURE — 0SJD3ZZ INSPECTION OF LEFT KNEE JOINT, PERCUTANEOUS APPROACH: ICD-10-PCS | Performed by: EMERGENCY MEDICINE

## 2024-09-22 PROCEDURE — 87640 STAPH A DNA AMP PROBE: CPT | Performed by: STUDENT IN AN ORGANIZED HEALTH CARE EDUCATION/TRAINING PROGRAM

## 2024-09-22 PROCEDURE — 99222 1ST HOSP IP/OBS MODERATE 55: CPT | Performed by: STUDENT IN AN ORGANIZED HEALTH CARE EDUCATION/TRAINING PROGRAM

## 2024-09-22 PROCEDURE — 258N000003 HC RX IP 258 OP 636: Performed by: STUDENT IN AN ORGANIZED HEALTH CARE EDUCATION/TRAINING PROGRAM

## 2024-09-22 PROCEDURE — 99285 EMERGENCY DEPT VISIT HI MDM: CPT | Mod: 25

## 2024-09-22 PROCEDURE — 120N000001 HC R&B MED SURG/OB

## 2024-09-22 PROCEDURE — 250N000011 HC RX IP 250 OP 636: Performed by: EMERGENCY MEDICINE

## 2024-09-22 PROCEDURE — 20610 DRAIN/INJ JOINT/BURSA W/O US: CPT | Mod: LT

## 2024-09-22 PROCEDURE — 86140 C-REACTIVE PROTEIN: CPT

## 2024-09-22 PROCEDURE — 87641 MR-STAPH DNA AMP PROBE: CPT | Performed by: STUDENT IN AN ORGANIZED HEALTH CARE EDUCATION/TRAINING PROGRAM

## 2024-09-22 RX ORDER — VANCOMYCIN HYDROCHLORIDE
1500 ONCE
Status: COMPLETED | OUTPATIENT
Start: 2024-09-22 | End: 2024-09-22

## 2024-09-22 RX ORDER — SULFAMETHOXAZOLE/TRIMETHOPRIM 800-160 MG
2 TABLET ORAL 2 TIMES DAILY
Status: ON HOLD | COMMUNITY
End: 2024-09-25

## 2024-09-22 RX ORDER — ACETAMINOPHEN 650 MG/1
650 SUPPOSITORY RECTAL EVERY 6 HOURS PRN
Status: DISCONTINUED | OUTPATIENT
Start: 2024-09-22 | End: 2024-09-25 | Stop reason: HOSPADM

## 2024-09-22 RX ORDER — DOCUSATE SODIUM 100 MG/1
100 CAPSULE, LIQUID FILLED ORAL
Status: DISCONTINUED | OUTPATIENT
Start: 2024-09-23 | End: 2024-09-25 | Stop reason: HOSPADM

## 2024-09-22 RX ORDER — BUDESONIDE AND FORMOTEROL FUMARATE DIHYDRATE 160; 4.5 UG/1; UG/1
2 AEROSOL RESPIRATORY (INHALATION) 2 TIMES DAILY
Status: DISCONTINUED | OUTPATIENT
Start: 2024-09-22 | End: 2024-09-25 | Stop reason: HOSPADM

## 2024-09-22 RX ORDER — LIDOCAINE 40 MG/G
CREAM TOPICAL
Status: DISCONTINUED | OUTPATIENT
Start: 2024-09-22 | End: 2024-09-25 | Stop reason: HOSPADM

## 2024-09-22 RX ORDER — ALBUTEROL SULFATE 0.83 MG/ML
2.5 SOLUTION RESPIRATORY (INHALATION) EVERY 6 HOURS PRN
Status: DISCONTINUED | OUTPATIENT
Start: 2024-09-22 | End: 2024-09-25 | Stop reason: HOSPADM

## 2024-09-22 RX ORDER — CEPHALEXIN 500 MG/1
500 CAPSULE ORAL 4 TIMES DAILY
Status: ON HOLD | COMMUNITY
End: 2024-09-25

## 2024-09-22 RX ORDER — POLYETHYLENE GLYCOL 3350 17 G/17G
17 POWDER, FOR SOLUTION ORAL
Status: DISCONTINUED | OUTPATIENT
Start: 2024-09-23 | End: 2024-09-25 | Stop reason: HOSPADM

## 2024-09-22 RX ORDER — PIPERACILLIN SODIUM, TAZOBACTAM SODIUM 3; .375 G/15ML; G/15ML
3.38 INJECTION, POWDER, LYOPHILIZED, FOR SOLUTION INTRAVENOUS EVERY 8 HOURS
Status: DISCONTINUED | OUTPATIENT
Start: 2024-09-23 | End: 2024-09-25

## 2024-09-22 RX ORDER — OXYCODONE HYDROCHLORIDE 5 MG/1
5 TABLET ORAL EVERY 4 HOURS PRN
Status: DISCONTINUED | OUTPATIENT
Start: 2024-09-22 | End: 2024-09-25 | Stop reason: HOSPADM

## 2024-09-22 RX ORDER — ACETAMINOPHEN 325 MG/1
650 TABLET ORAL EVERY 6 HOURS PRN
Status: DISCONTINUED | OUTPATIENT
Start: 2024-09-22 | End: 2024-09-25 | Stop reason: HOSPADM

## 2024-09-22 RX ORDER — IBUPROFEN 400 MG/1
400 TABLET, FILM COATED ORAL EVERY 8 HOURS
Status: DISCONTINUED | OUTPATIENT
Start: 2024-09-22 | End: 2024-09-25 | Stop reason: HOSPADM

## 2024-09-22 RX ORDER — ACETAMINOPHEN 325 MG/1
650 TABLET ORAL ONCE
Status: COMPLETED | OUTPATIENT
Start: 2024-09-22 | End: 2024-09-22

## 2024-09-22 RX ORDER — PIPERACILLIN SODIUM, TAZOBACTAM SODIUM 3; .375 G/15ML; G/15ML
3.38 INJECTION, POWDER, LYOPHILIZED, FOR SOLUTION INTRAVENOUS ONCE
Status: COMPLETED | OUTPATIENT
Start: 2024-09-22 | End: 2024-09-22

## 2024-09-22 RX ORDER — VANCOMYCIN HYDROCHLORIDE 1 G/200ML
1000 INJECTION, SOLUTION INTRAVENOUS EVERY 12 HOURS
Status: DISCONTINUED | OUTPATIENT
Start: 2024-09-23 | End: 2024-09-24

## 2024-09-22 RX ADMIN — IBUPROFEN 400 MG: 400 TABLET ORAL at 21:53

## 2024-09-22 RX ADMIN — BUDESONIDE AND FORMOTEROL FUMARATE DIHYDRATE 2 PUFF: 160; 4.5 AEROSOL RESPIRATORY (INHALATION) at 21:53

## 2024-09-22 RX ADMIN — SODIUM CHLORIDE 1000 ML: 9 INJECTION, SOLUTION INTRAVENOUS at 20:04

## 2024-09-22 RX ADMIN — VANCOMYCIN HYDROCHLORIDE 1500 MG: 5 INJECTION, POWDER, LYOPHILIZED, FOR SOLUTION INTRAVENOUS at 20:53

## 2024-09-22 RX ADMIN — PIPERACILLIN AND TAZOBACTAM 3.38 G: 3; .375 INJECTION, POWDER, FOR SOLUTION INTRAVENOUS at 20:21

## 2024-09-22 RX ADMIN — SODIUM CHLORIDE, POTASSIUM CHLORIDE, SODIUM LACTATE AND CALCIUM CHLORIDE 1000 ML: 600; 310; 30; 20 INJECTION, SOLUTION INTRAVENOUS at 22:28

## 2024-09-22 RX ADMIN — ACETAMINOPHEN 650 MG: 325 TABLET ORAL at 20:04

## 2024-09-22 ASSESSMENT — ACTIVITIES OF DAILY LIVING (ADL)
ADLS_ACUITY_SCORE: 38
ADLS_ACUITY_SCORE: 35

## 2024-09-22 ASSESSMENT — COLUMBIA-SUICIDE SEVERITY RATING SCALE - C-SSRS
2. HAVE YOU ACTUALLY HAD ANY THOUGHTS OF KILLING YOURSELF IN THE PAST MONTH?: NO
1. IN THE PAST MONTH, HAVE YOU WISHED YOU WERE DEAD OR WISHED YOU COULD GO TO SLEEP AND NOT WAKE UP?: NO
6. HAVE YOU EVER DONE ANYTHING, STARTED TO DO ANYTHING, OR PREPARED TO DO ANYTHING TO END YOUR LIFE?: NO

## 2024-09-22 NOTE — ED TRIAGE NOTES
Arrives to ED with c/o worsening L knee pain, swelling and chills. Swelling began on Friday. Seen at [facility] yesterday. Started on antibiotics.      Triage Assessment (Adult)       Row Name 09/22/24 9060          Triage Assessment    Airway WDL WDL        Respiratory WDL    Respiratory WDL WDL        Skin Circulation/Temperature WDL    Skin Circulation/Temperature WDL WDL        Cardiac WDL    Cardiac WDL X;rhythm     Pulse Rate & Regularity tachycardic        Peripheral/Neurovascular WDL    Peripheral Neurovascular WDL WDL        Cognitive/Neuro/Behavioral WDL    Cognitive/Neuro/Behavioral WDL WDL

## 2024-09-22 NOTE — ED PROVIDER NOTES
Emergency Department Midlevel Supervisory Note    Date/Time:9/22/2024 6:07 PM    I personally saw the patient and performed a substantive portion of the visit including all aspects of the medical decision making.  I am seeing this patient along with Melina Martinez PA-C.  IVinicius D.O., have reviewed the documentation, personally taken the patient's history, performed an exam and agree with the physical finds, diagnosis and management plan.    Prior records were reviewed.  I personally performed history and physical.  I personally reviewed lab, EKG, and radiology results as indicated.  Care was discussed with mid-level provider.  Diagnosis and disposition were discussed.  Final disposition will be per the [facility] depending diagnostic studies and patient's clinical trajectory.      ED Course:  6:08 PM Melina Martinez PA-C staffed the patient with me.     The patient presented to the emergency department today with complaints of left knee pain.  He notes some redness and swelling around this area.  On exam he does have significant erythema and swelling surrounding the left knee.  Arthrocentesis was performed but insufficient fluid was obtained.  Laboratory testing reveals a white count of 23,000.  CRP is elevated as well.  Case was discussed with orthopedics.  They feel that the patient's symptoms likely represent septic bursitis and recommend admission with IV antibiotics.  He is comfortable with this plan.          PROCEDURE: Arthrocentesis   INDICATIONS:  Left knee pain, swelling, and overlying redness   PROCEDURE PROVIDER: Dr Vinicius Kendall    CONSENT: Risks, benefits and alternatives were discussed with and Written consent was obtained from Patient.   TIME OUT: Universal protocol was followed. TIME OUT conducted just prior to starting procedure confirmed patient identity, site/side, procedure, patient position, and availability of correct equipment. Yes   SITE: Left knee   MEDICATIONS:  6 mLs of 1%  Lidocaine without epinephrine   NOTE: The area was prepped with chlorhexidine and draped off in the usual sterile fashion.  The joint was entered with an 18 gauge needle and 0 ml of fluid was withdrawn.    COMPLICATIONS: Patient tolerated procedure well, without complication        DIAGNOSIS:  1. Septic bursitis              Labs and Imaging:  Results for orders placed or performed during the hospital encounter of 09/22/24   XR Knee Left 3 Views    Impression    IMPRESSION: Chronic ossicles in the distal patellar tendon from prior Osgood-Schlatter's disease. Soft tissue fullness anterior to the patella and patellar tendon which could be from bursitis or direct trauma. No acute fracture. Mild lateral subluxation   and tilt of the patella. No effusion or calcified intra-articular body.              Basic metabolic panel   Result Value Ref Range    Sodium 134 (L) 135 - 145 mmol/L    Potassium 4.3 3.4 - 5.3 mmol/L    Chloride 98 98 - 107 mmol/L    Carbon Dioxide (CO2) 20 (L) 22 - 29 mmol/L    Anion Gap 16 (H) 7 - 15 mmol/L    Urea Nitrogen 11.8 6.0 - 20.0 mg/dL    Creatinine 1.01 0.67 - 1.17 mg/dL    GFR Estimate >90 >60 mL/min/1.73m2    Calcium 9.4 8.8 - 10.4 mg/dL    Glucose 102 (H) 70 - 99 mg/dL   Lactic acid whole blood   Result Value Ref Range    Lactic Acid 1.7 0.7 - 2.0 mmol/L   Erythrocyte sedimentation rate auto   Result Value Ref Range    Erythrocyte Sedimentation Rate 10 0 - 15 mm/hr   Result Value Ref Range    CRP Inflammation 169.00 (H) <5.00 mg/L   CBC with platelets and differential   Result Value Ref Range    WBC Count 23.1 (H) 4.0 - 11.0 10e3/uL    RBC Count 5.35 4.40 - 5.90 10e6/uL    Hemoglobin 17.0 13.3 - 17.7 g/dL    Hematocrit 46.8 40.0 - 53.0 %    MCV 88 78 - 100 fL    MCH 31.8 26.5 - 33.0 pg    MCHC 36.3 31.5 - 36.5 g/dL    RDW 12.4 10.0 - 15.0 %    Platelet Count 122 (L) 150 - 450 10e3/uL    NRBCs per 100 WBC 0 <1 /100    Absolute NRBCs 0.0 10e3/uL   Manual Differential   Result Value Ref Range     % Neutrophils 89 %    % Lymphocytes 6 %    % Monocytes 5 %    % Eosinophils 0 %    % Basophils 0 %    Absolute Neutrophils 20.6 (H) 1.6 - 8.3 10e3/uL    Absolute Lymphocytes 1.4 0.8 - 5.3 10e3/uL    Absolute Monocytes 1.2 0.0 - 1.3 10e3/uL    Absolute Eosinophils 0.0 0.0 - 0.7 10e3/uL    Absolute Basophils 0.0 0.0 - 0.2 10e3/uL    RBC Morphology Confirmed RBC Indices     Platelet Assessment  Automated Count Confirmed. Platelet morphology is normal.     Automated Count Confirmed. Platelet morphology is normal.    Reactive Lymphocytes Present (A) None Seen         Vinicius Kendall D.O.  Emergency Medicine  [facility] EMERGENCY ROOM  [address] [address] [phone]  Dept: [phone]            Vinicius Kendall, DO  09/24/24 0854

## 2024-09-22 NOTE — ED PROVIDER NOTES
EMERGENCY DEPARTMENT ENCOUNTER      NAME: Augustine Matthews  AGE: 44 year old male  YOB: 1980  MRN: [id number]  EVALUATION DATE & TIME: No admission date for patient encounter.    PCP: [facility] - [place], [facility]    ED PROVIDER: Melina Martinez PA-C      Chief Complaint   Patient presents with    Knee Pain       FINAL IMPRESSION:  1. Septic bursitis      ED COURSE & MEDICAL DECISION MAKING:    Pertinent Labs & Imaging studies reviewed. (See chart for details)  44 year old male presents to the Emergency Department for evaluation of knee pain.  Per chart review on 9/21/2024 patient was seen at [place] emergency room for left knee pain.  Patient noted redness, swelling and increased pain with walking.  Patient has a history of septic bursitis and was concerned about his knee pain.  Patient had a normal CRP.  White blood cell count was 14.2.  X-ray of the left knee shows no acute fractures or dislocation.  No knee joint effusion.  Large separation portion of the tibial tubercle suggesting sequel to prior old avulsion injury or osgood-schlatter disease.  Probable prepatellar soft tissue swelling.  Patient was discharged home with Bactrim and Keflex.  Patient returned to the emergency room today for increased pain, swelling, redness and reports fevers and chills.  Vital signs reviewed and patient is tachycardic.  Afebrile.  On exam left knee is tender to palpation.  Remainder the left lower extremity is nontender to palpation.  Normal range of motion, sensation and strength of the left lower extremity.  Left knee has overlying erythema, edema and warmth.  No ecchymosis.  Pulses are 2+ bilaterally.  Normal capillary refill.    Differential diagnosis includes septic joint, cellulitis, septic bursitis. Patient was consented for an arthrocentesis.  Arthrocentesis was performed by Dr. Kendall.  Patient tolerated the procedure well but unfortunately no fluid was able to be drained off of the knee.   Today patient's white blood cell count was 23.1.  WBC was 14.2 yesterday.  Sodium is 134.  Anion gap is 16.  Creatinine is 1.01.  Lactic acid is not elevated at 1.7.  Blood cultures are pending.  Yesterday CRP and ESR were not elevated.  Today patient's CRP is elevated at 169 and  ESR is 10. XR knee is pending. Tylenol, fluids was ordered for the patient.  Spoke with [facility].  Patient  was educated on his results and agrees with the plan to be admitted.  All questions answered. Signed out to Dr. Kendall pending disposition. .       ED COURSE:   5:43 PM I saw the patient.   5:53 PM Staffed with Dr. Kendall.   6:11 PM Patient was consented for an arthrocentesis.  6:46 PM arthrocentesis was performed   7:43 PM I spoke with [facility].   7:47 PM  Signed out to Dr. Kendall.     At the conclusion of the encounter I discussed the results of all of the tests and the disposition. The questions were answered. The patient or family acknowledged understanding and was agreeable with the care plan.     0 minutes of critical care time       Medical Decision Making  Obtained supplemental history:Supplemental history obtained?: No  Reviewed external records: External records reviewed?: Outpatient Record: [facility] 9/21/2024  Care impacted by chronic illness:N/A  Care significantly affected by social determinants of health:N/A  Did you consider but not order tests?: Work up considered but not performed and documented in chart, if applicable  Did you interpret images independently?: Independent interpretation of ECG and images noted in documentation, when applicable.  Consultation discussion with other provider:Did you involve another provider (consultant, , pharmacy, etc.)?: I discussed the care with another health care provider, see documentation for details.  Admit.    Not Applicable    MEDICATIONS GIVEN IN THE EMERGENCY:  Medications   acetaminophen (TYLENOL) tablet 650 mg (650 mg Oral $Given 9/22/24 2004)    sodium chloride 0.9% BOLUS 1,000 mL (0 mLs Intravenous Stopped 9/22/24 2133)   piperacillin-tazobactam (ZOSYN) 3.375 g vial to attach to  mL bag (3.375 g Intravenous $Given 9/22/24 2021)   vancomycin (VANCOCIN) 1,500 mg in 0.9% NaCl 250 mL intermittent infusion (1,500 mg Intravenous $New Bag 9/22/24 2053)   lactated ringers BOLUS 1,000 mL (1,000 mLs Intravenous $New Bag 9/22/24 2228)   influenza trivalent vaccine for ages 6 months to 49 years (PF) (FLUZONE) injection 0.5 mL (0.5 mLs Intramuscular $Given 9/25/24 1048)       NEW PRESCRIPTIONS STARTED AT TODAY'S ER VISIT  Discharge Medication List as of 9/25/2024  3:04 PM        START taking these medications    Details   acetaminophen (TYLENOL) 325 MG tablet Take 2 tablets (650 mg) by mouth every 6 hours as needed for mild pain or other (and adjunct with moderate or severe pain or per patient request)., Disp-30 tablet, R-0, E-Prescribe      cefdinir (OMNICEF) 300 MG capsule Take 1 capsule (300 mg) by mouth 2 times daily for 10 days., Disp-20 capsule, R-0, E-Prescribe      doxycycline hyclate (VIBRAMYCIN) 100 MG capsule Take 1 capsule (100 mg) by mouth 2 times daily for 10 days., Disp-20 capsule, R-0, E-Prescribe                =================================================================    HPI    Patient information was obtained from: patient     Use of : N/A         Augustine Matthews is a 44 year old male with a pertinent history of septic bursitis who presents to this ED via walk-in for evaluation of left knee pain.    The patient was seen yesterday at [facility] for evaluation of left knee pain and swelling that began on 9/20. He was started on antibiotics but his left knee has increased swelling, pain, and redness. He also endorses subjective fever and chills. Before this pain began he had been walking more often and thinks this may have triggered it.     Chart Review:  9/21/2024: The patient was seen yesterday at [facility] ED for evaluation of  knee pain that began on the evening of 9/20. Overnight into 9/21 the pain worsened with new redness/swelling that made walking difficult. No trauma or injuries. He was able to bear weight on his leg but increased pain with range of motion. Labs significant for leukocytosis but normal CRP. He was started on oral antibiotics for treatment of possible septic prepatellar bursitis. He was discharged and advised to follow up with PCP early next week.       REVIEW OF SYSTEMS   As per HPI    PAST MEDICAL HISTORY:  Past Medical History:   Diagnosis Date    Uncomplicated asthma        PAST SURGICAL HISTORY:  Past Surgical History:   Procedure Laterality Date    ENT SURGERY      nose fiaxation, broken    HERNIA REPAIR      febuary/march 2018    INGUINAL HERNIA REPAIR      THORACOSCOPIC PLEURODESIS      ZZC REMOVAL PARIETAL PLEURA      Description: Parietal Pleurectomy;  Recorded: 02/17/2012;           CURRENT MEDICATIONS:    acetaminophen (TYLENOL) 325 MG tablet  albuterol (PROVENTIL) (2.5 MG/3ML) 0.083% neb solution  budesonide-formoterol (SYMBICORT) 160-4.5 MCG/ACT Inhaler  carbamide peroxide (DEBROX) 6.5 % otic solution  cefdinir (OMNICEF) 300 MG capsule  doxycycline hyclate (VIBRAMYCIN) 100 MG capsule  ibuprofen (ADVIL/MOTRIN) 800 MG tablet  lidocaine (XYLOCAINE) 5 % external ointment        ALLERGIES:  Allergies   Allergen Reactions    No Known Allergies        FAMILY HISTORY:  Family History   Problem Relation Age of Onset    Asthma Mother     Depression Mother     Lung Cancer Maternal Grandfather     Diabetes No family hx of     Hypertension No family hx of     No Known Problems Mother        SOCIAL HISTORY:   Social History     Socioeconomic History    Marital status: Single   Tobacco Use    Smoking status: Former    Smokeless tobacco: Never    Tobacco comments:     exposure to smokers   Substance and Sexual Activity    Alcohol use: No    Drug use: No     Social Determinants of Health     Financial Resource Strain: Low  "Risk  (10/23/2023)    Financial Resource Strain     Within the past 12 months, have you or your family members you live with been unable to get utilities (heat, electricity) when it was really needed?: No   Food Insecurity: High Risk (10/23/2023)    Food Insecurity     Within the past 12 months, did you worry that your food would run out before you got money to buy more?: Yes     Within the past 12 months, did the food you bought just not last and you didn t have money to get more?: Yes   Transportation Needs: High Risk (10/23/2023)    Transportation Needs     Within the past 12 months, has lack of transportation kept you from medical appointments, getting your medicines, non-medical meetings or appointments, work, or from getting things that you need?: Yes   Housing Stability: Low Risk  (10/23/2023)    Housing Stability     Do you have housing? : Yes     Are you worried about losing your housing?: No       VITALS:  BP (!) 146/87 (BP Location: Right arm)   Pulse 69   Temp 97.7  F (36.5  C) (Oral)   Resp 16   Ht 1.753 m (5' 9\")   Wt 68 kg (150 lb)   SpO2 99%   BMI 22.15 kg/m      PHYSICAL EXAM    Physical Exam  Vitals and nursing note reviewed.   Constitutional:       Appearance: Normal appearance.   HENT:      Head: Atraumatic.      Right Ear: External ear normal.      Left Ear: External ear normal.      Nose: Nose normal.      Mouth/Throat:      Mouth: Mucous membranes are moist.   Eyes:      Conjunctiva/sclera: Conjunctivae normal.      Pupils: Pupils are equal, round, and reactive to light.   Cardiovascular:      Rate and Rhythm: Normal rate and regular rhythm.      Pulses: Normal pulses.      Heart sounds: Normal heart sounds. No murmur heard.     No friction rub. No gallop.   Pulmonary:      Effort: Pulmonary effort is normal.      Breath sounds: Normal breath sounds. No wheezing or rales.   Abdominal:      Tenderness: There is no abdominal tenderness. There is no guarding or rebound.   Musculoskeletal: "      Cervical back: Normal range of motion.      Comments: Left knee is tender to palpation. Remainder of extremities are nontender to palpation.  Normal range of motion, sensation and strength of the left lower extremity.  Pulses are 2+ bilaterally.  Normal capillary refill.  Left knee has overlying erythema, edema and warmth.  No ecchymosis.  No lacerations or abrasions.   Skin:     General: Skin is dry.   Neurological:      Mental Status: He is alert. Mental status is at baseline.   Psychiatric:         Mood and Affect: Mood normal.         Thought Content: Thought content normal.          LAB:  All pertinent labs reviewed and interpreted.  Labs Ordered and Resulted from Time of ED Arrival to Time of ED Departure   BASIC METABOLIC PANEL - Abnormal       Result Value    Sodium 134 (*)     Potassium 4.3      Chloride 98      Carbon Dioxide (CO2) 20 (*)     Anion Gap 16 (*)     Urea Nitrogen 11.8      Creatinine 1.01      GFR Estimate >90      Calcium 9.4      Glucose 102 (*)    CRP INFLAMMATION - Abnormal    CRP Inflammation 169.00 (*)    CBC WITH PLATELETS AND DIFFERENTIAL - Abnormal    WBC Count 23.1 (*)     RBC Count 5.35      Hemoglobin 17.0      Hematocrit 46.8      MCV 88      MCH 31.8      MCHC 36.3      RDW 12.4      Platelet Count 122 (*)     NRBCs per 100 WBC 0      Absolute NRBCs 0.0     MANUAL DIFFERENTIAL - Abnormal    % Neutrophils 89      % Lymphocytes 6      % Monocytes 5      % Eosinophils 0      % Basophils 0      Absolute Neutrophils 20.6 (*)     Absolute Lymphocytes 1.4      Absolute Monocytes 1.2      Absolute Eosinophils 0.0      Absolute Basophils 0.0      RBC Morphology Confirmed RBC Indices      Platelet Assessment        Value: Automated Count Confirmed. Platelet morphology is normal.    Reactive Lymphocytes Present (*)    LACTIC ACID WHOLE BLOOD - Normal    Lactic Acid 1.7     ERYTHROCYTE SEDIMENTATION RATE AUTO - Normal    Erythrocyte Sedimentation Rate 10          RADIOLOGY:  Reviewed  all pertinent imaging. Please see official radiology report.  MR Knee Left w/o Contrast   Final Result   IMPRESSION:   1.  There is moderate prepatellar soft tissue swelling. Cannot exclude trace bursitis.    2.  Intact cruciate ligaments, collateral ligaments, and menisci.   3.  Low-grade patellofemoral compartment chondromalacia.   4.  Mild lateral patellar tilt and subluxation.   5.  Chronic fragmentation of the tibial tuberosity.    6.  No knee joint effusion.         XR Knee Left 3 Views   Final Result   IMPRESSION: Chronic ossicles in the distal patellar tendon from prior Osgood-Schlatter's disease. Soft tissue fullness anterior to the patella and patellar tendon which could be from bursitis or direct trauma. No acute fracture. Mild lateral subluxation    and tilt of the patella. No effusion or calcified intra-articular body.          PROCEDURES:   See Dr. Kendall' note.    I, Trey Bal, am serving as a scribe to document services personally performed by Melina Martinez PA-C, based on my observation and the provider's statements to me. I, Melina Martinez PA-C, attest that Trey Bal is acting in a scribe capacity, has observed my performance of the services and has documented them in accordance with my direction.    Melina Martinez PA-C  [facility] EMERGENCY ROOM  [address] [id number] [phone]      Melina Martinez PA-C  10/04/24 0810

## 2024-09-23 ENCOUNTER — APPOINTMENT (OUTPATIENT)
Dept: PHYSICAL THERAPY | Facility: CLINIC | Age: 44
DRG: 872 | End: 2024-09-23
Attending: STUDENT IN AN ORGANIZED HEALTH CARE EDUCATION/TRAINING PROGRAM
Payer: COMMERCIAL

## 2024-09-23 ENCOUNTER — APPOINTMENT (OUTPATIENT)
Dept: OCCUPATIONAL THERAPY | Facility: CLINIC | Age: 44
DRG: 872 | End: 2024-09-23
Attending: STUDENT IN AN ORGANIZED HEALTH CARE EDUCATION/TRAINING PROGRAM
Payer: COMMERCIAL

## 2024-09-23 ENCOUNTER — APPOINTMENT (OUTPATIENT)
Dept: MRI IMAGING | Facility: CLINIC | Age: 44
DRG: 872 | End: 2024-09-23
Attending: PHYSICIAN ASSISTANT
Payer: COMMERCIAL

## 2024-09-23 LAB
ALBUMIN SERPL BCG-MCNC: 3.4 G/DL (ref 3.5–5.2)
ALP SERPL-CCNC: 133 U/L (ref 40–150)
ALT SERPL W P-5'-P-CCNC: 68 U/L (ref 0–70)
ANION GAP SERPL CALCULATED.3IONS-SCNC: 12 MMOL/L (ref 7–15)
AST SERPL W P-5'-P-CCNC: 56 U/L (ref 0–45)
BILIRUB DIRECT SERPL-MCNC: 0.6 MG/DL (ref 0–0.3)
BILIRUB SERPL-MCNC: 1.5 MG/DL
BUN SERPL-MCNC: 9.7 MG/DL (ref 6–20)
CALCIUM SERPL-MCNC: 8.6 MG/DL (ref 8.8–10.4)
CHLORIDE SERPL-SCNC: 105 MMOL/L (ref 98–107)
CREAT SERPL-MCNC: 0.99 MG/DL (ref 0.67–1.17)
CRP SERPL-MCNC: 147 MG/L
EGFRCR SERPLBLD CKD-EPI 2021: >90 ML/MIN/1.73M2
ERYTHROCYTE [DISTWIDTH] IN BLOOD BY AUTOMATED COUNT: 12.8 % (ref 10–15)
GLUCOSE SERPL-MCNC: 127 MG/DL (ref 70–99)
HCO3 SERPL-SCNC: 21 MMOL/L (ref 22–29)
HCT VFR BLD AUTO: 45.9 % (ref 40–53)
HGB BLD-MCNC: 15.9 G/DL (ref 13.3–17.7)
MCH RBC QN AUTO: 31.5 PG (ref 26.5–33)
MCHC RBC AUTO-ENTMCNC: 34.6 G/DL (ref 31.5–36.5)
MCV RBC AUTO: 91 FL (ref 78–100)
MRSA DNA SPEC QL NAA+PROBE: NEGATIVE
PLATELET # BLD AUTO: 102 10E3/UL (ref 150–450)
POTASSIUM SERPL-SCNC: 4.1 MMOL/L (ref 3.4–5.3)
PROT SERPL-MCNC: 6.3 G/DL (ref 6.4–8.3)
RBC # BLD AUTO: 5.04 10E6/UL (ref 4.4–5.9)
SA TARGET DNA: POSITIVE
SODIUM SERPL-SCNC: 138 MMOL/L (ref 135–145)
WBC # BLD AUTO: 14.2 10E3/UL (ref 4–11)

## 2024-09-23 PROCEDURE — 97116 GAIT TRAINING THERAPY: CPT | Mod: GP

## 2024-09-23 PROCEDURE — 258N000003 HC RX IP 258 OP 636: Performed by: STUDENT IN AN ORGANIZED HEALTH CARE EDUCATION/TRAINING PROGRAM

## 2024-09-23 PROCEDURE — 85027 COMPLETE CBC AUTOMATED: CPT | Performed by: STUDENT IN AN ORGANIZED HEALTH CARE EDUCATION/TRAINING PROGRAM

## 2024-09-23 PROCEDURE — 250N000011 HC RX IP 250 OP 636: Performed by: STUDENT IN AN ORGANIZED HEALTH CARE EDUCATION/TRAINING PROGRAM

## 2024-09-23 PROCEDURE — 36415 COLL VENOUS BLD VENIPUNCTURE: CPT | Performed by: STUDENT IN AN ORGANIZED HEALTH CARE EDUCATION/TRAINING PROGRAM

## 2024-09-23 PROCEDURE — 82248 BILIRUBIN DIRECT: CPT | Performed by: STUDENT IN AN ORGANIZED HEALTH CARE EDUCATION/TRAINING PROGRAM

## 2024-09-23 PROCEDURE — 97535 SELF CARE MNGMENT TRAINING: CPT | Mod: GO

## 2024-09-23 PROCEDURE — 97165 OT EVAL LOW COMPLEX 30 MIN: CPT | Mod: GO

## 2024-09-23 PROCEDURE — 250N000013 HC RX MED GY IP 250 OP 250 PS 637: Performed by: INTERNAL MEDICINE

## 2024-09-23 PROCEDURE — 99232 SBSQ HOSP IP/OBS MODERATE 35: CPT | Performed by: STUDENT IN AN ORGANIZED HEALTH CARE EDUCATION/TRAINING PROGRAM

## 2024-09-23 PROCEDURE — 86140 C-REACTIVE PROTEIN: CPT | Performed by: STUDENT IN AN ORGANIZED HEALTH CARE EDUCATION/TRAINING PROGRAM

## 2024-09-23 PROCEDURE — 250N000013 HC RX MED GY IP 250 OP 250 PS 637: Performed by: STUDENT IN AN ORGANIZED HEALTH CARE EDUCATION/TRAINING PROGRAM

## 2024-09-23 PROCEDURE — 73721 MRI JNT OF LWR EXTRE W/O DYE: CPT | Mod: LT

## 2024-09-23 PROCEDURE — 80053 COMPREHEN METABOLIC PANEL: CPT | Performed by: STUDENT IN AN ORGANIZED HEALTH CARE EDUCATION/TRAINING PROGRAM

## 2024-09-23 PROCEDURE — 97161 PT EVAL LOW COMPLEX 20 MIN: CPT | Mod: GP

## 2024-09-23 PROCEDURE — 120N000001 HC R&B MED SURG/OB

## 2024-09-23 PROCEDURE — 250N000011 HC RX IP 250 OP 636: Performed by: EMERGENCY MEDICINE

## 2024-09-23 RX ORDER — SODIUM CHLORIDE, SODIUM LACTATE, POTASSIUM CHLORIDE, CALCIUM CHLORIDE 600; 310; 30; 20 MG/100ML; MG/100ML; MG/100ML; MG/100ML
INJECTION, SOLUTION INTRAVENOUS CONTINUOUS
Status: DISCONTINUED | OUTPATIENT
Start: 2024-09-23 | End: 2024-09-24

## 2024-09-23 RX ADMIN — BUDESONIDE AND FORMOTEROL FUMARATE DIHYDRATE 2 PUFF: 160; 4.5 AEROSOL RESPIRATORY (INHALATION) at 08:10

## 2024-09-23 RX ADMIN — BUDESONIDE AND FORMOTEROL FUMARATE DIHYDRATE 2 PUFF: 160; 4.5 AEROSOL RESPIRATORY (INHALATION) at 22:18

## 2024-09-23 RX ADMIN — DOCUSATE SODIUM 100 MG: 100 CAPSULE, LIQUID FILLED ORAL at 18:24

## 2024-09-23 RX ADMIN — ACETAMINOPHEN 650 MG: 325 TABLET ORAL at 08:53

## 2024-09-23 RX ADMIN — PIPERACILLIN AND TAZOBACTAM 3.38 G: 3; .375 INJECTION, POWDER, FOR SOLUTION INTRAVENOUS at 02:23

## 2024-09-23 RX ADMIN — SODIUM CHLORIDE, POTASSIUM CHLORIDE, SODIUM LACTATE AND CALCIUM CHLORIDE: 600; 310; 30; 20 INJECTION, SOLUTION INTRAVENOUS at 08:09

## 2024-09-23 RX ADMIN — IBUPROFEN 400 MG: 400 TABLET ORAL at 15:01

## 2024-09-23 RX ADMIN — VANCOMYCIN HYDROCHLORIDE 1000 MG: 1 INJECTION, SOLUTION INTRAVENOUS at 08:53

## 2024-09-23 RX ADMIN — POLYETHYLENE GLYCOL 3350 17 G: 17 POWDER, FOR SOLUTION ORAL at 18:25

## 2024-09-23 RX ADMIN — IBUPROFEN 400 MG: 400 TABLET ORAL at 06:52

## 2024-09-23 RX ADMIN — POLYETHYLENE GLYCOL 3350 17 G: 17 POWDER, FOR SOLUTION ORAL at 08:10

## 2024-09-23 RX ADMIN — SALINE NASAL SPRAY 1 SPRAY: 1.5 SOLUTION NASAL at 22:17

## 2024-09-23 RX ADMIN — PIPERACILLIN AND TAZOBACTAM 3.38 G: 3; .375 INJECTION, POWDER, FOR SOLUTION INTRAVENOUS at 18:24

## 2024-09-23 RX ADMIN — IBUPROFEN 400 MG: 400 TABLET ORAL at 22:19

## 2024-09-23 RX ADMIN — PIPERACILLIN AND TAZOBACTAM 3.38 G: 3; .375 INJECTION, POWDER, FOR SOLUTION INTRAVENOUS at 10:54

## 2024-09-23 RX ADMIN — VANCOMYCIN HYDROCHLORIDE 1000 MG: 1 INJECTION, SOLUTION INTRAVENOUS at 22:19

## 2024-09-23 RX ADMIN — DOCUSATE SODIUM 100 MG: 100 CAPSULE, LIQUID FILLED ORAL at 08:10

## 2024-09-23 ASSESSMENT — ACTIVITIES OF DAILY LIVING (ADL)
ADLS_ACUITY_SCORE: 39
ADLS_ACUITY_SCORE: 38
ADLS_ACUITY_SCORE: 39
ADLS_ACUITY_SCORE: 38
ADLS_ACUITY_SCORE: 39
ADLS_ACUITY_SCORE: 39
ADLS_ACUITY_SCORE: 38
ADLS_ACUITY_SCORE: 38
ADLS_ACUITY_SCORE: 39
ADLS_ACUITY_SCORE: 38

## 2024-09-23 NOTE — PROGRESS NOTES
[facility] MEDICINE  PROGRESS NOTE       Securely message me with David (more info)    Code Status: Full Code       Identification/Summary:   Augustine Matthews is a 44 year old male with PMH signficant for TBI s/p facial reconstruction, asthma.  Presented with L knee pain for 2 days. He was at [facility] yesterday and was diagnosed with bursitis. He came back due to worsening swelling and pain. He is febrile, tachycardiac and has a leukocytosis. ED attempted to collect pus for culture but wasn't successful. Ortho is consulted and recommended MRI and abx. In the ED, patient received blood culture. Vanc and zosyn are initiated.     Assessment and Plan:  Sepsis  L knee septic bursitis vs septic arthritis  X-ray showed Chronic ossicles in the distal patellar tendon from prior Osgood-Schlatter's disease. Soft tissue fullness anterior to the patella and patellar tendon which could be from bursitis or direct trauma. No acute fracture. Mild lateral subluxation and tilt of the patella. No effusion or calcified intra-articular body.  Pending MRI  Ibuprofen 400mg q8h  Oxycodone 5mg prn not needed  S/p blood culture, obtain MRSA swab  Continue vancomycin and zosyn  NPO at midnight per ortho  1L bolus fluid now, start LR 100ml/h then     Thrombocytopenia  Monitor on ibuprofen    Asthma  Says he uses inhalers twice a day  Continue albuterol and LABA-ICS as needed    Anticoagulation   Orders (Includes Only Anticoagulants)    None      Therapy: PT  Flanagan:Not present  Lines: None       Current Diet  Orders Placed This Encounter      Regular Diet Adult        Clinically Significant Risk Factors Present on Admission         # Hyponatremia: Lowest Na = 134 mmol/L in last 2 days, will monitor as appropriate  # Hypocalcemia: Lowest Ca = 8.6 mg/dL in last 2 days, will monitor and replace as appropriate     # Hypoalbuminemia: Lowest albumin = 3.4 g/dL at 9/23/2024  7:26 AM, will monitor as appropriate   #  Thrombocytopenia: Lowest platelets = 102 in last 2 days, will monitor for bleeding               # Asthma: noted on problem list        Interval History/Subjective:  He says pain and swelling are both much better. He can bend his L knee now.      Last 24H PRN:     acetaminophen (TYLENOL) tablet 650 mg, 650 mg at 09/23/24 0853 **OR** acetaminophen (TYLENOL) Suppository 650 mg    Physical Exam/Objective:  Temp:  [97.4  F (36.3  C)-101.5  F (38.6  C)] 97.4  F (36.3  C)  Pulse:  [] 73  Resp:  [16-20] 16  BP: (112-138)/(62-85) 117/72  SpO2:  [97 %-100 %] 97 %  Wt Readings from Last 4 Encounters:   09/22/24 68 kg (150 lb)   07/04/24 68 kg (150 lb)   11/04/23 74.8 kg (165 lb)   10/23/23 73.5 kg (162 lb)     Body mass index is 22.15 kg/m .    General Appearance: Alert and wake, not in distress  Neurology: oriented x 3  Psych: cooperative and calm, normal affect  MSK: L knee redness and swelling much improved    Medications:   Personally Reviewed.  Medications   Current Facility-Administered Medications   Medication Dose Route Frequency Provider Last Rate Last Admin    lactated ringers infusion   Intravenous Continuous Cara Mensah MD 75 mL/hr at 09/23/24 0809 New Bag at 09/23/24 0809     Current Facility-Administered Medications   Medication Dose Route Frequency Provider Last Rate Last Admin    budesonide-formoterol (SYMBICORT) 160-4.5 MCG/ACT Inhaler 2 puff  2 puff Inhalation BID Cara Mensah MD   2 puff at 09/23/24 0810    docusate sodium (COLACE) capsule 100 mg  100 mg Oral BID Cara Mensah MD   100 mg at 09/23/24 0810    ibuprofen (ADVIL/MOTRIN) tablet 400 mg  400 mg Oral Q8H Cara Mensah MD   400 mg at 09/23/24 1501    piperacillin-tazobactam (ZOSYN) 3.375 g vial to attach to  mL bag  3.375 g Intravenous Q8H Cara Mensah MD   3.375 g at 09/23/24 1054    polyethylene glycol (MIRALAX) Packet 17 g  17 g Oral BID Cara Mensah MD   17 g at 09/23/24 0810    sodium chloride (PF) 0.9% PF flush 3 mL  3 mL  Intracatheter Q8H Cara Mensah MD        vancomycin (VANCOCIN) 1,000 mg in NaCl 0.9% 200 mL intermittent infusion  1,000 mg Intravenous Q12H Viniicus Kendall DO   1,000 mg at 09/23/24 0853       Data reviewed today: I personally reviewed all new medications, labs, imaging/diagnostics reports over the past 24 hours. Pertinent findings include:    Imaging:   Recent Results (from the past 24 hour(s))   XR Knee Left 3 Views    Narrative    EXAM: XR KNEE LEFT 3 VIEWS  LOCATION: [facility]  DATE: 9/22/2024    INDICATION: Knee pain.  COMPARISON: None.      Impression    IMPRESSION: Chronic ossicles in the distal patellar tendon from prior Osgood-Schlatter's disease. Soft tissue fullness anterior to the patella and patellar tendon which could be from bursitis or direct trauma. No acute fracture. Mild lateral subluxation   and tilt of the patella. No effusion or calcified intra-articular body.       Labs:  XR Knee Left 3 Views   Final Result   IMPRESSION: Chronic ossicles in the distal patellar tendon from prior Osgood-Schlatter's disease. Soft tissue fullness anterior to the patella and patellar tendon which could be from bursitis or direct trauma. No acute fracture. Mild lateral subluxation    and tilt of the patella. No effusion or calcified intra-articular body.      MR Knee Left w/o Contrast    (Results Pending)     Recent Results (from the past 24 hour(s))   Basic metabolic panel    Collection Time: 09/22/24  6:23 PM   Result Value Ref Range    Sodium 134 (L) 135 - 145 mmol/L    Potassium 4.3 3.4 - 5.3 mmol/L    Chloride 98 98 - 107 mmol/L    Carbon Dioxide (CO2) 20 (L) 22 - 29 mmol/L    Anion Gap 16 (H) 7 - 15 mmol/L    Urea Nitrogen 11.8 6.0 - 20.0 mg/dL    Creatinine 1.01 0.67 - 1.17 mg/dL    GFR Estimate >90 >60 mL/min/1.73m2    Calcium 9.4 8.8 - 10.4 mg/dL    Glucose 102 (H) 70 - 99 mg/dL   Lactic acid whole blood    Collection Time: 09/22/24  6:23 PM   Result Value Ref Range     Lactic Acid 1.7 0.7 - 2.0 mmol/L   Blood Culture Peripheral Blood    Collection Time: 09/22/24  6:23 PM    Specimen: Peripheral Blood   Result Value Ref Range    Culture No growth after 12 hours    Erythrocyte sedimentation rate auto    Collection Time: 09/22/24  6:23 PM   Result Value Ref Range    Erythrocyte Sedimentation Rate 10 0 - 15 mm/hr   CRP inflammation    Collection Time: 09/22/24  6:23 PM   Result Value Ref Range    CRP Inflammation 169.00 (H) <5.00 mg/L   CBC with platelets and differential    Collection Time: 09/22/24  6:23 PM   Result Value Ref Range    WBC Count 23.1 (H) 4.0 - 11.0 10e3/uL    RBC Count 5.35 4.40 - 5.90 10e6/uL    Hemoglobin 17.0 13.3 - 17.7 g/dL    Hematocrit 46.8 40.0 - 53.0 %    MCV 88 78 - 100 fL    MCH 31.8 26.5 - 33.0 pg    MCHC 36.3 31.5 - 36.5 g/dL    RDW 12.4 10.0 - 15.0 %    Platelet Count 122 (L) 150 - 450 10e3/uL    NRBCs per 100 WBC 0 <1 /100    Absolute NRBCs 0.0 10e3/uL   Manual Differential    Collection Time: 09/22/24  6:23 PM   Result Value Ref Range    % Neutrophils 89 %    % Lymphocytes 6 %    % Monocytes 5 %    % Eosinophils 0 %    % Basophils 0 %    Absolute Neutrophils 20.6 (H) 1.6 - 8.3 10e3/uL    Absolute Lymphocytes 1.4 0.8 - 5.3 10e3/uL    Absolute Monocytes 1.2 0.0 - 1.3 10e3/uL    Absolute Eosinophils 0.0 0.0 - 0.7 10e3/uL    Absolute Basophils 0.0 0.0 - 0.2 10e3/uL    RBC Morphology Confirmed RBC Indices     Platelet Assessment  Automated Count Confirmed. Platelet morphology is normal.     Automated Count Confirmed. Platelet morphology is normal.    Reactive Lymphocytes Present (A) None Seen   MRSA MSSA PCR, Nasal Swab    Collection Time: 09/22/24  8:54 PM    Specimen: Nares, Bilateral; Swab   Result Value Ref Range    MRSA Target DNA Negative Negative    SA Target DNA Positive    Basic metabolic panel    Collection Time: 09/23/24  7:26 AM   Result Value Ref Range    Sodium 138 135 - 145 mmol/L    Potassium 4.1 3.4 - 5.3 mmol/L    Chloride 105 98 - 107  mmol/L    Carbon Dioxide (CO2) 21 (L) 22 - 29 mmol/L    Anion Gap 12 7 - 15 mmol/L    Urea Nitrogen 9.7 6.0 - 20.0 mg/dL    Creatinine 0.99 0.67 - 1.17 mg/dL    GFR Estimate >90 >60 mL/min/1.73m2    Calcium 8.6 (L) 8.8 - 10.4 mg/dL    Glucose 127 (H) 70 - 99 mg/dL   Hepatic panel    Collection Time: 09/23/24  7:26 AM   Result Value Ref Range    Protein Total 6.3 (L) 6.4 - 8.3 g/dL    Albumin 3.4 (L) 3.5 - 5.2 g/dL    Bilirubin Total 1.5 (H) <=1.2 mg/dL    Alkaline Phosphatase 133 40 - 150 U/L    AST 56 (H) 0 - 45 U/L    ALT 68 0 - 70 U/L    Bilirubin Direct 0.60 (H) 0.00 - 0.30 mg/dL   CBC with platelets    Collection Time: 09/23/24  7:26 AM   Result Value Ref Range    WBC Count 14.2 (H) 4.0 - 11.0 10e3/uL    RBC Count 5.04 4.40 - 5.90 10e6/uL    Hemoglobin 15.9 13.3 - 17.7 g/dL    Hematocrit 45.9 40.0 - 53.0 %    MCV 91 78 - 100 fL    MCH 31.5 26.5 - 33.0 pg    MCHC 34.6 31.5 - 36.5 g/dL    RDW 12.8 10.0 - 15.0 %    Platelet Count 102 (L) 150 - 450 10e3/uL   CRP inflammation    Collection Time: 09/23/24  7:26 AM   Result Value Ref Range    CRP Inflammation 147.00 (H) <5.00 mg/L       Pending Labs:  Unresulted Labs Ordered in the Past 30 Days of this Admission       Date and Time Order Name Status Description    9/22/2024  5:46 PM Blood Culture Peripheral Blood Preliminary               LINDSEY KENNY MD  [facility]  Phone: #[phone]    Securely message me with [email or web address] (more info)

## 2024-09-23 NOTE — PROGRESS NOTES
09/23/24 0345   Appointment Info   Signing Clinician's Name / Credentials (PT) Kim Mack, PT, DPT   Living Environment   People in Home alone   Current Living Arrangements apartment   Home Accessibility stairs to enter home   Number of Stairs, Main Entrance greater than 10 stairs  (three flights)   Stair Railings, Main Entrance railings safe and in good condition   Self-Care   Equipment Currently Used at Home none   General Information   Onset of Illness/Injury or Date of Surgery 09/22/24   Referring Physician Cara Mensah MD   Patient/Family Therapy Goals Statement (PT) to get better   Pertinent History of Current Problem (include personal factors and/or comorbidities that impact the POC) 44 year old male with PMH signficant for TBI s/p facial reconstruction, asthma.  Presented with L knee pain for 2 days. He was at [facility] yesterday and was diagnosed with bursitis. He came back due to worsening swelling and pain. He is febrile, tachycardiac and has a leukocytosis. ED attempted to collect pus for culture but wasn't successful. Ortho is consulted and recommended MRI and abx. In the ED, patient received blood culture. Vanc and zosyn are initiated.   Existing Precautions/Restrictions no known precautions/restrictions   Weight-Bearing Status - LLE weight-bearing as tolerated   Weight-Bearing Status - RLE full weight-bearing   Bed Mobility   Bed Mobility supine-sit;sit-supine   Supine-Sit Hershey (Bed Mobility) independent   Sit-Supine [place] (Bed Mobility) independent   Comment, (Bed Mobility) Independent with supine<>sit transfers.   Transfers   Transfers sit-stand transfer   Comment, (Transfers) Independent with sit<>stand transfers.   Sit-Stand Transfer   Sit-Stand [place] (Transfers) independent   Comment, (Sit-Stand Transfer) Independent with sit<>Stand transfers   Gait/Stairs (Locomotion)   Hershey Level (Gait) supervision;verbal cues   Distance in Feet (Gait) 10'   Pattern (Gait)  step-through   Deviations/Abnormal Patterns (Gait) base of support, wide;angelito decreased;gait speed decreased   Maintains Weight-bearing Status (Gait) able to maintain   Negotiation (Stairs) stairs independence;handrail location;number of steps;ascending technique;descending technique   [place] Level (Stairs) supervision;verbal cues   Handrail Location (Stairs) both sides   Number of Steps (Stairs) 4 steps   Ascending Technique (Stairs) step-over-step   Descending Technique (Stairs) step-to-step   Comment, (Gait/Stairs) Pt ambulates with SBA and no AD. Verbal cues for safety. Pt negotiates 4 steps with bilateral railings and SBA. Verbal cues for safety and safe step technique.   Clinical Impression   Criteria for Skilled Therapeutic Intervention Yes, treatment indicated   PT Diagnosis (PT) impaired functional mobility   Influenced by the following impairments weakness, pain   Functional limitations due to impairments transfers, ambulation, stairs   Clinical Presentation (PT Evaluation Complexity) stable   Clinical Presentation Rationale Pt presents as medically diagnosed   Clinical Decision Making (Complexity) low complexity   Planned Therapy Interventions (PT) balance training;bed mobility training;gait training;home exercise program;patient/family education;ROM (range of motion);stair training;strengthening;stretching;transfer training   Risk & Benefits of therapy have been explained evaluation/treatment results reviewed;care plan/treatment goals reviewed;patient   PT Total Evaluation Time   PT Eval, Low Complexity Minutes (72361) 10   Physical Therapy Goals   PT Frequency One time eval and treatment only   PT Predicted Duration/Target Date for Goal Attainment 09/23/24   PT Goals Transfers;Gait;Stairs   PT: Transfers Independent;Sit to/from stand;Goal Met   PT: Gait Supervision/stand-by assist;Greater than 200 feet;Goal Met   PT: Stairs Supervision/stand-by assist;4 stairs;Rail on both sides;Goal Met    Interventions   Interventions Quick Adds Gait Training;Therapeutic Activity   Therapeutic Activity   Treatment Detail/Skilled Intervention Independent with supine<>sit transfers. Independent with sit<>stand transfers. Verbal cues for safety. Pt supine in bed at end of session with call light within reach.   Gait Training   Gait Training Minutes (72341) 15   Treatment Detail/Skilled Intervention Pt ambulates with SBA and no AD. Verbal cues for safety. Education on use of cane and further ambulation. Pt negotiates 4 steps with bilateral railings and SBA. Verbal cues for safety and safe step technique.   Distance in Feet 600'   Beaver Level (Gait Training) stand-by assist   Physical Assistance Level (Gait Training) supervision;verbal cues   Weight Bearing (Gait Training) weight-bearing as tolerated   Pattern Analysis (Gait Training) swing-to gait   Gait Analysis Deviations decreased angeliot;decreased step length   Impairments (Gait Analysis/Training) pain   Stair Railings present at both sides   Physical Assist/Nonphysical Assist (Stairs) supervision;verbal cues   Level of Beaver (Stairs) stand-by assist   PT Discharge Planning   PT Plan discharge PT   PT Discharge Recommendation (DC Rec) (S)  home with assist   PT Rationale for DC Rec Pt reports good home setup and has support from family.   PT Brief overview of current status Independent with transfers, SBA with no AD for 600 feet   Total Session Time   Timed Code Treatment Minutes 15   Total Session Time (sum of timed and untimed services) 25     Kim Mack, PT, DPT

## 2024-09-23 NOTE — CONSULTS
ORTHOPEDIC CONSULTATION    Consultation  Augustine Matthews,  1980, MRN [id number]    [unfilled]  Septic bursitis [M71.10]    [phone]   Code status:  Full Code       Extended Emergency Contact Information  Primary Emergency Contact: LYNDA SOUTH  Mobile Phone: [phone]  Relation: Son         IMPRESSION:  Septic prepatellar bursitis left knee     PLAN:  This patient was discussed with Dr. Pinedo, on-call surgeon for [facility] and they are in agreement with the following plan.     - continue current IV antibiotics  - plan for non-operative treatment, but will keep NPO until obtain MRI results in case there is a fluid   collection that may need to be drained  - pain management    Thank you for including [facility] in the care of Augustine Matthews. It has been a pleasure participating in his care.        CHIEF COMPLAINT: <principal problem not specified>    HISTORY OF PRESENT ILLNESS:  The patient is seen in orthopedic consultation at the request of Dr. Mensah.  The patient is a 44 year old male with a PMH of TBI s/p facial reconstruction, asthma.  He presented to the ED on 24 with a chief complaint of left knee pain and swelling that started atraumatically on . He was seen at [facility] on  and was advised if the swelling increased that he should present to the ED. [facility] ED attempted to collect pus for culture but wasn't successful.  He is currently on IV antibiotics which were initiated in the ED.  Patient rates pain as a 7 on a scale of 1-10.  The patient denies shortness of breath, calf pain, chest pain, nausea or vomiting.     PAST MEDICAL HISTORY:  Past Medical History:   Diagnosis Date    Uncomplicated asthma           ALLERGIES:   Review of patient's allergies indicates   Allergies   Allergen Reactions    No Known Allergies          MEDICATIONS UPON ADMISSION:  Medications were  reviewed.  They include:   Medications Prior to Admission   Medication Sig Dispense Refill Last Dose    albuterol (PROVENTIL) (2.5 MG/3ML) 0.083% neb solution Take 1 vial (2.5 mg) by nebulization every 6 hours as needed for shortness of breath, wheezing or cough 90 mL 1 9/22/2024 at AM    budesonide-formoterol (SYMBICORT) 160-4.5 MCG/ACT Inhaler INHALE 2 PUFFS BY MOUTH TWICE DAILY PLUS 1 TO 2 PUFFS AS NEEDED MAY USE UP TO 12 PUFFS PER DAY 20.4 g 1 9/22/2024 at AM    carbamide peroxide (DEBROX) 6.5 % otic solution Place 5 drops in ear(s)   Past Month at PRN    cephALEXin (KEFLEX) 500 MG capsule Take 500 mg by mouth 4 times daily. For 7 days   9/22/2024 at 2 of 4 4pm    ibuprofen (ADVIL/MOTRIN) 800 MG tablet TAKE 1 TABLET(800 MG) BY MOUTH EVERY 8 HOURS AS NEEDED FOR MODERATE PAIN 100 tablet 0 Past Week at PRN    lidocaine (XYLOCAINE) 5 % external ointment Apply topically daily as needed for moderate pain 240 g 1 Past Month at PRN    sulfamethoxazole-trimethoprim (BACTRIM DS) 800-160 MG tablet Take 2 tablets by mouth 2 times daily. For 7 days   9/22/2024 at 1 tab at 4pm         SOCIAL HISTORY:   he  reports that he has quit smoking. He has never used smokeless tobacco. He reports that he does not drink alcohol and does not use drugs.    FAMILY HISTORY:  family history includes Asthma in his mother; Depression in his mother; Lung Cancer in his maternal grandfather; No Known Problems in his mother.      REVIEW OF SYSTEMS:   Reviewed with patient. See HPI, otherwise negative       PHYSICAL EXAMINATION:  Vitals: Temp:  [97.4  F (36.3  C)-101.5  F (38.6  C)] 97.4  F (36.3  C)  Pulse:  [] 73  Resp:  [16-20] 16  BP: (112-138)/(62-85) 117/72  SpO2:  [97 %-100 %] 97 %  General: On examination, the patient is resting comfortably, NAD, awake, and alert and oriented to person, place, time, and, and general circumstances   SKIN: There is  a small punctate skin opening anterior superior left knee where aspiration was  attempted, o/w no evidence of  open wounds/abrasions. There is prepatellar swelling and mild warmth.  Pulses:  dorsalis pedis and posterior tibial pulse is intact and equal bilaterally  Sensation: intact and equal bilaterally to the distal lower extremities.  Tenderness: mildly TTP over left patella and anterior knee  ROM: active extension and flexion  Motor: 5/5 Tib Ant, 5/5 EHL, 5/5 Gastroc Soleus  Contralateral side= Full range of motion, Negative joint instability findings, 5/5 motor groups about the joint, Non-tender.       RADIOGRAPHIC EVALUATION:  X-rays: three views of the left knee obtained 9/22/24 are  reviewed by me and show:    IMPRESSION: Chronic ossicles in the distal patellar tendon from prior Osgood-Schlatter's disease. Soft tissue fullness anterior to the patella and patellar tendon which could be from bursitis or direct trauma. No acute fracture. Mild lateral subluxation   and tilt of the patella. No effusion or calcified intra-articular body.    PERTINENT LABS:  Lab Results: personally reviewed.       Lab Results   Component Value Date    WBC 14.2 09/23/2024    HGB 15.9 09/23/2024    HCT 45.9 09/23/2024    MCV 91 09/23/2024     09/23/2024       Clinically Significant Risk Factors Present on Admission      # Thrombocytopenia: Lowest platelets = 102 in last 2 days, will monitor for bleeding        #CRP includes abnormal value:   Recent labs: (last 2 weeks)     09/22/24  1823 09/23/24  0726   CRPI 169.00* 147.00*     #WBC includes abnormal value:  Recent labs: (last 2 weeks)     09/22/24  1823 09/23/24  0726   WBC 23.1* 14.2*     Risk Factors for Delirium:  - Pt has elevated WBC >11 within the past 24 hours.        Mary Smith PA-C, PA-C  Date: 9/23/2024  Time: 8:58 AM    CC1:   Cara Mensah MD    CC2:   [facility] [place]     .[email or web address]

## 2024-09-23 NOTE — PHARMACY-ADMISSION MEDICATION HISTORY
Pharmacist Admission Medication History    Admission medication history is complete. The information provided in this note is only as accurate as the sources available at the time of the update.    Information Source(s): Patient via in-person    Pertinent Information: Patient states his Bactrim is 1 tablet twice daily. Dispense history and note from Danielito state 2 tabs twice daily. He said he took 1 bactrim today and 2 keflex.    Changes made to PTA medication list:  Added: bactrim, keflex  Deleted: ciprodex, naproxen  Changed: None    Allergies reviewed with patient and updates made in EHR: yes    Medication History Completed By: Osvaldo Tena [facility] 9/22/2024 7:16 PM    PTA Med List   Medication Sig Last Dose    albuterol (PROVENTIL) (2.5 MG/3ML) 0.083% neb solution Take 1 vial (2.5 mg) by nebulization every 6 hours as needed for shortness of breath, wheezing or cough 9/22/2024 at AM    budesonide-formoterol (SYMBICORT) 160-4.5 MCG/ACT Inhaler INHALE 2 PUFFS BY MOUTH TWICE DAILY PLUS 1 TO 2 PUFFS AS NEEDED MAY USE UP TO 12 PUFFS PER DAY 9/22/2024 at AM    carbamide peroxide (DEBROX) 6.5 % otic solution Place 5 drops in ear(s) Past Month at PRN    cephALEXin (KEFLEX) 500 MG capsule Take 500 mg by mouth 4 times daily. For 7 days 9/22/2024 at 2 of 4 4pm    ibuprofen (ADVIL/MOTRIN) 800 MG tablet TAKE 1 TABLET(800 MG) BY MOUTH EVERY 8 HOURS AS NEEDED FOR MODERATE PAIN Past Week at PRN    lidocaine (XYLOCAINE) 5 % external ointment Apply topically daily as needed for moderate pain Past Month at PRN    sulfamethoxazole-trimethoprim (BACTRIM DS) 800-160 MG tablet Take 2 tablets by mouth 2 times daily. For 7 days 9/22/2024 at 1 tab at 4pm

## 2024-09-23 NOTE — PHARMACY-VANCOMYCIN DOSING SERVICE
"Pharmacy Vancomycin Initial Note  Date of Service 2024  Patient's  1980  44 year old, male    Indication:  bone and joint infection    Current estimated CrCl = Estimated Creatinine Clearance: 89.8 mL/min (based on SCr of 1.01 mg/dL).    Creatinine for last 3 days  2024:  6:23 PM Creatinine 1.01 mg/dL    Recent Vancomycin Level(s) for last 3 days  No results found for requested labs within last 3 days.      Vancomycin IV Administrations (past 72 hours)        No vancomycin orders with administrations in past 72 hours.                    Nephrotoxins and other renal medications (From now, onward)      Start     Dose/Rate Route Frequency Ordered Stop    24 0900  vancomycin (VANCOCIN) 1,000 mg in NaCl 0.9% 200 mL intermittent infusion         1,000 mg  over 60 Minutes Intravenous EVERY 12 HOURS 24 2100  vancomycin (VANCOCIN) 1,500 mg in 0.9% NaCl 250 mL intermittent infusion         1,500 mg  166.7 mL/hr over 90 Minutes Intravenous ONCE 24  piperacillin-tazobactam (ZOSYN) 3.375 g vial to attach to  mL bag        Note to Pharmacy: For [facility], [facility] and [facility]: For Zosyn-naive patients, use the \"Zosyn initial dose + extended infusion\" order panel.    3.375 g  over 30 Minutes Intravenous ONCE 24              Contrast Orders - past 72 hours (72h ago, onward)      None            [employer] Prediction of Planned Initial Vancomycin Regimen  Loading dose: 1500 mg at 21:00 2024.  Regimen: 1000 mg IV every 12 hours.  Start time: 09:00 on 2024  Exposure target: AUC24 (range)400-600 mg/L.hr   AUC24,ss: 554 mg/L.hr  Probability of AUC24 > 400: 81 %  Ctrough,ss: 17.3 mg/L  Probability of Ctrough,ss > 20: 38 %  Probability of nephrotoxicity (Lodise DEDE ): 13 %        Plan:  Start vancomycin  1500 mg IV once as loading dose then 1000mg vancomycin IV  q12h.   Vancomycin monitoring method: AUC  Vancomycin therapeutic " monitoring goal: 400-600 mg*h/L  Pharmacy will check vancomycin levels as appropriate in 1-3 Days.    Serum creatinine levels will be ordered daily for the first week of therapy and at least twice weekly for subsequent weeks.      Osvaldo Tena RPH

## 2024-09-23 NOTE — H&P
[facility] MEDICINE ADMISSION HISTORY AND PHYSICAL   Date of Admission: 9/22/2024  Augustine Matthews, 1980, [id number]    Identification/Summary:   Augustine Matthews is a 44 year old male with PMH signficant for TBI s/p facial reconstruction, asthma.  Presented with L knee pain for 2 days. He was at [facility] yesterday and was diagnosed with bursitis. He came back due to worsening swelling and pain. He is febrile, tachycardiac and has a leukocytosis. ED attempted to collect pus for culture but wasn't successful. Ortho is consulted and recommended MRI and abx. In the ED, patient received blood culture. Vanc and zosyn are initiated.    Assessment and Plan:  Sepsis  L knee septic bursitis vs septic arthritis  X-ray showed Chronic ossicles in the distal patellar tendon from prior Osgood-Schlatter's disease. Soft tissue fullness anterior to the patella and patellar tendon which could be from bursitis or direct trauma. No acute fracture. Mild lateral subluxation and tilt of the patella. No effusion or calcified intra-articular body.  Pending MRI  Ibuprofen 400mg q8h  Oxycodone 5mg prn  S/p blood culture, obtain MRSA swab  Continue vancomycin and zosyn  NPO at midnight per ortho  1L bolus fluid now, start LR 100ml/h then    Asthma  Says he uses inhalers twice a day  Continue albuterol and LABA-ICS as needed    Code Status: Full Code    IVF: 1L bolus then 100ml/h    FoleyNot present    Disposition: Inpatient     Clinically Significant Risk Factors Present on Admission         # Hyponatremia: Lowest Na = 134 mmol/L in last 2 days, will monitor as appropriate        # Thrombocytopenia: Lowest platelets = 122 in last 2 days, will monitor for bleeding               # Asthma: noted on problem list        Chief Complaint L knee pain     HISTORY   Augustine Matthews is a 44 year old male with PMH signficant for TBI s/p facial reconstruction, asthma.  Presented with L knee pain for 2 days. He was at  [facility] yesterday and was diagnosed with bursitis. He came back due to worsening swelling and pain.     He denies trauma. He denies discharge from the knee. He hasn't been able to walk since Friday due to pain. He denies drug use.    He is febrile, tachycardiac and has a leukocytosis. ED attempted to collect pus for culture but wasn't successful. Ortho is consulted and recommended MRI and abx. In the ED, patient received blood culture. Vanc and zosyn are initiated.          Past Medical History     Past Medical History:  No date: Uncomplicated asthma     Patient Active Problem List    Diagnosis Date Noted    Septic bursitis 09/22/2024     Priority: Medium    History of ADHD 01/14/2019     Priority: Medium    Moderate persistent asthma without complication 09/25/2017     Priority: Medium     Surgical History     Past Surgical History:   Procedure Laterality Date    ENT SURGERY      nose fiaxation, broken    HERNIA REPAIR      febuary/march 2018    INGUINAL HERNIA REPAIR      THORACOSCOPIC PLEURODESIS      ZZC REMOVAL PARIETAL PLEURA      Description: Parietal Pleurectomy;  Recorded: 02/17/2012;     Family History      Family History   Problem Relation Age of Onset    Asthma Mother     Depression Mother     Lung Cancer Maternal Grandfather     Diabetes No family hx of     Hypertension No family hx of     No Known Problems Mother       Social History      Social History     Tobacco Use    Smoking status: Former    Smokeless tobacco: Never    Tobacco comments:     exposure to smokers   Substance Use Topics    Alcohol use: No    Drug use: No      Allergies     Allergies   Allergen Reactions    No Known Allergies      Prior to Admission Medications      Prior to Admission Medications   Prescriptions Last Dose Informant Patient Reported? Taking?   albuterol (PROVENTIL) (2.5 MG/3ML) 0.083% neb solution 9/22/2024 at AM  No Yes   Sig: Take 1 vial (2.5 mg) by nebulization every 6 hours as needed for shortness of breath,  wheezing or cough   budesonide-formoterol (SYMBICORT) 160-4.5 MCG/ACT Inhaler 9/22/2024 at AM  No Yes   Sig: INHALE 2 PUFFS BY MOUTH TWICE DAILY PLUS 1 TO 2 PUFFS AS NEEDED MAY USE UP TO 12 PUFFS PER DAY   carbamide peroxide (DEBROX) 6.5 % otic solution Past Month at PRN  Yes Yes   Sig: Place 5 drops in ear(s)   cephALEXin (KEFLEX) 500 MG capsule 9/22/2024 at 2 of 4 4pm  Yes Yes   Sig: Take 500 mg by mouth 4 times daily. For 7 days   ibuprofen (ADVIL/MOTRIN) 800 MG tablet Past Week at PRN  No Yes   Sig: TAKE 1 TABLET(800 MG) BY MOUTH EVERY 8 HOURS AS NEEDED FOR MODERATE PAIN   lidocaine (XYLOCAINE) 5 % external ointment Past Month at PRN  No Yes   Sig: Apply topically daily as needed for moderate pain   sulfamethoxazole-trimethoprim (BACTRIM DS) 800-160 MG tablet 9/22/2024 at 1 tab at 4pm  Yes Yes   Sig: Take 2 tablets by mouth 2 times daily. For 7 days      Facility-Administered Medications: None      Review of Systems     A 12 point comprehensive review of systems was negative except as noted above in HPI.    PHYSICAL EXAMINATION     Vitals      Temp:  [97.5  F (36.4  C)-101.5  F (38.6  C)] 101.5  F (38.6  C)  Pulse:  [111-124] 114  Resp:  [18-20] 20  BP: (127-138)/(78-85) 128/78  SpO2:  [97 %-100 %] 99 %    Examination     General Appearance: Alert and wake, not in distress  Respiratory: clear lungs, no crackles or wheezing  Cardiovascular: rhythmic, normal S1 and S2, no murmur  GI: soft, non-tender, normal bowel sound  Neurology: oriented x 3  Psych: cooperative and calm, normal affect      Pertinent Lab     Results for orders placed or performed during the hospital encounter of 09/22/24 (from the past 24 hour(s))   CBC with platelets + differential    Narrative    The following orders were created for panel order CBC with platelets + differential.  Procedure                               Abnormality         Status                     ---------                               -----------         ------                      CBC with platelets and d...[[id number]]  Abnormal            Final result               Manual Differential[[id number]]          Abnormal            Final result                 Please view results for these tests on the individual orders.   Basic metabolic panel   Result Value Ref Range    Sodium 134 (L) 135 - 145 mmol/L    Potassium 4.3 3.4 - 5.3 mmol/L    Chloride 98 98 - 107 mmol/L    Carbon Dioxide (CO2) 20 (L) 22 - 29 mmol/L    Anion Gap 16 (H) 7 - 15 mmol/L    Urea Nitrogen 11.8 6.0 - 20.0 mg/dL    Creatinine 1.01 0.67 - 1.17 mg/dL    GFR Estimate >90 >60 mL/min/1.73m2    Calcium 9.4 8.8 - 10.4 mg/dL    Glucose 102 (H) 70 - 99 mg/dL   Lactic acid whole blood   Result Value Ref Range    Lactic Acid 1.7 0.7 - 2.0 mmol/L   Erythrocyte sedimentation rate auto   Result Value Ref Range    Erythrocyte Sedimentation Rate 10 0 - 15 mm/hr   CRP inflammation   Result Value Ref Range    CRP Inflammation 169.00 (H) <5.00 mg/L   CBC with platelets and differential   Result Value Ref Range    WBC Count 23.1 (H) 4.0 - 11.0 10e3/uL    RBC Count 5.35 4.40 - 5.90 10e6/uL    Hemoglobin 17.0 13.3 - 17.7 g/dL    Hematocrit 46.8 40.0 - 53.0 %    MCV 88 78 - 100 fL    MCH 31.8 26.5 - 33.0 pg    MCHC 36.3 31.5 - 36.5 g/dL    RDW 12.4 10.0 - 15.0 %    Platelet Count 122 (L) 150 - 450 10e3/uL    NRBCs per 100 WBC 0 <1 /100    Absolute NRBCs 0.0 10e3/uL   Manual Differential   Result Value Ref Range    % Neutrophils 89 %    % Lymphocytes 6 %    % Monocytes 5 %    % Eosinophils 0 %    % Basophils 0 %    Absolute Neutrophils 20.6 (H) 1.6 - 8.3 10e3/uL    Absolute Lymphocytes 1.4 0.8 - 5.3 10e3/uL    Absolute Monocytes 1.2 0.0 - 1.3 10e3/uL    Absolute Eosinophils 0.0 0.0 - 0.7 10e3/uL    Absolute Basophils 0.0 0.0 - 0.2 10e3/uL    RBC Morphology Confirmed RBC Indices     Platelet Assessment  Automated Count Confirmed. Platelet morphology is normal.     Automated Count Confirmed. Platelet morphology is normal.    Reactive Lymphocytes  Present (A) None Seen   XR Knee Left 3 Views    Narrative    EXAM: XR KNEE LEFT 3 VIEWS  LOCATION: [facility]  DATE: 9/22/2024    INDICATION: Knee pain.  COMPARISON: None.      Impression    IMPRESSION: Chronic ossicles in the distal patellar tendon from prior Osgood-Schlatter's disease. Soft tissue fullness anterior to the patella and patellar tendon which could be from bursitis or direct trauma. No acute fracture. Mild lateral subluxation   and tilt of the patella. No effusion or calcified intra-articular body.       Pertinent Radiology     Radiology Results:   Recent Results (from the past 24 hour(s))   XR Knee Left 3 Views    Narrative    EXAM: XR KNEE LEFT 3 VIEWS  LOCATION: [facility]  DATE: 9/22/2024    INDICATION: Knee pain.  COMPARISON: None.      Impression    IMPRESSION: Chronic ossicles in the distal patellar tendon from prior Osgood-Schlatter's disease. Soft tissue fullness anterior to the patella and patellar tendon which could be from bursitis or direct trauma. No acute fracture. Mild lateral subluxation   and tilt of the patella. No effusion or calcified intra-articular body.       I spoke with Dr. Kendall for patient's care.        LINDSEY KENNY MD  Hospitalist  [facility]  [facility]   Phone: #[phone]

## 2024-09-23 NOTE — PLAN OF CARE
Problem: Pain Acute  Goal: Optimal Pain Control and Function  Outcome: Progressing  Intervention: Prevent or Manage Pain  Recent Flowsheet Documentation  Taken 9/22/2024 2155 by Ian Abel RN  Medication Review/Management: medications reviewed      Patient alert and oriented. VSS. Tachycardic. RA. IV bolus infusing. On NPO.  Scheduled pain medication given. Call light within reach. Alarms on.

## 2024-09-23 NOTE — PLAN OF CARE
Goal Outcome Evaluation:    Pt is alert & oriented x4. Vitally stable.  On room air. PIV is saline locked between IV ABX. L knee has some redness and swelling. Scheduled Ibuprofen administered for pain control. Voiding in bedside urinal. NPO status for possible surgery today.

## 2024-09-23 NOTE — TREATMENT PLAN
Consult received. Labs and imaging reviewed. Full consult to follow in AM.    44 y male with worsening knee pain, redness, and anterior swelling. Patient is able to range the knee. Photos demonstrate prepatellar soft tissue swelling and erythema, no effusion.     Xrays demonstrate prepatellar swelling, no effusion, no fracture.  Aspirate performed in ED was a dry tap. WBC 23, ESR 10, .    Imaging and data consistent with septic prepatellar bursitis vs.cellulitis. Lack of effusion on imaging and no fluid on aspirate argue against septic joint. Would recommend initiating treatment for septic bursitis.    -Begin IV abx (vanco/zosyn) and narrow per medicine  - Pain control, ice, elevation  -Admit to medicine team  -NPO at midnight in case washout necessary, but typically managed nonsurgically initially  -MRI scan of the left knee    Full consult note to follow in AM    DUANE BAILEY MD

## 2024-09-23 NOTE — PLAN OF CARE
Pt A&Ox4. Independently in the room. Endorsed pain, PRN tylenol given, getting better. Noticed swelling left knee cap with redness, but pt stated getting better. On regular diet. LR infusing at 75ml/hr. Denies CP, SOB, N/V, numbness or tingling. Call appropriately and make needs known to staff.         Problem: Adult Inpatient Plan of Care  Goal: Optimal Comfort and Wellbeing  9/23/2024 1657 by Kim Hatch RN  Outcome: Progressing  9/23/2024 1447 by Kim Hatch RN  Outcome: Progressing  Intervention: Monitor Pain and Promote Comfort  Recent Flowsheet Documentation  Taken 9/23/2024 0800 by Kim Hatch RN  Pain Management Interventions:   medication (see MAR)   care clustered   music therapy   pain management plan reviewed with patient/caregiver   rest     Problem: Pain Acute  Goal: Optimal Pain Control and Function  9/23/2024 1657 by Kim Hatch RN  Outcome: Progressing  9/23/2024 1447 by Kim Hatch RN  Outcome: Progressing  Intervention: Develop Pain Management Plan  Recent Flowsheet Documentation  Taken 9/23/2024 0800 by Kim Hatch RN  Pain Management Interventions:   medication (see MAR)   care clustered   music therapy   pain management plan reviewed with patient/caregiver   rest  Intervention: Prevent or Manage Pain  Recent Flowsheet Documentation  Taken 9/23/2024 0800 by Kim Hatch RN  Sensory Stimulation Regulation:   music on   television on  Bowel Elimination Promotion:   adequate fluid intake promoted   ambulation promoted  Medication Review/Management: medications reviewed

## 2024-09-23 NOTE — PLAN OF CARE
Problem: Adult Inpatient Plan of Care  Goal: Optimal Comfort and Wellbeing  9/23/2024 1657 by Kim Hatch RN  Outcome: Progressing  9/23/2024 1447 by Kim Hatch RN  Outcome: Progressing  Intervention: Monitor Pain and Promote Comfort  Recent Flowsheet Documentation  Taken 9/23/2024 0800 by Kim Hatch RN  Pain Management Interventions:   medication (see MAR)   care clustered   music therapy   pain management plan reviewed with patient/caregiver   rest     Problem: Pain Acute  Goal: Optimal Pain Control and Function  9/23/2024 1657 by Kim Hatch RN  Outcome: Progressing  9/23/2024 1447 by Kim Hatch RN  Outcome: Progressing  Intervention: Develop Pain Management Plan  Recent Flowsheet Documentation  Taken 9/23/2024 0800 by Kim Hatch RN  Pain Management Interventions:   medication (see MAR)   care clustered   music therapy   pain management plan reviewed with patient/caregiver   rest  Intervention: Prevent or Manage Pain  Recent Flowsheet Documentation  Taken 9/23/2024 0800 by Kim Hatch RN  Sensory Stimulation Regulation:   music on   television on  Bowel Elimination Promotion:   adequate fluid intake promoted   ambulation promoted  Medication Review/Management: medications reviewed     Pt A&Ox4. VSS on RA. Independently in the room. IV infusing at 75ml/hr. Denies CP, SOB, HA, N/V. But endorsed pain 5/10 consistently. Gave PRN Tylenol, and tolerable. MRI check list done. Will be scheduled around PM today. Back on regular diet. Calls appropriately. Alarms on for pt safety.

## 2024-09-23 NOTE — PLAN OF CARE
Physical Therapy Discharge Summary    Reason for therapy discharge:    All goals and outcomes met, no further needs identified.    Progress towards therapy goal(s). See goals on Care Plan in [facility] electronic health record for goal details.  Goals met    Therapy recommendation(s):    No further therapy is recommended.

## 2024-09-23 NOTE — PROGRESS NOTES
Occupational Therapy Discharge Summary    Reason for therapy discharge:    All goals and outcomes met, no further needs identified.    Progress towards therapy goal(s). See goals on Care Plan in [facility] electronic health record for goal details.  Goals met    Therapy recommendation(s):    No further therapy is recommended.       09/23/24 1005   Appointment Info   Signing Clinician's Name / Credentials (OT) Cecy Magana OTD OTR/L   Living Environment   People in Home alone   Current Living Arrangements apartment   Home Accessibility stairs to enter home   Number of Stairs, Main Entrance greater than 10 stairs  (Lives on 3rd floor)   Living Environment Comments Tub/shower, standard toilets, lives on 3rd floor of apartment building   Self-Care   Equipment Currently Used at Home none   Activity/Exercise/Self-Care Comment Typically ind with all ADLs and IADLs - not currently working   General Information   Onset of Illness/Injury or Date of Surgery 09/22/24   Referring Physician Cara Mensah MD   Patient/Family Therapy Goal Statement (OT) To decrease pain, to return home   Additional Occupational Profile Info/Pertinent History of Current Problem Augustine Matthews is a 44 year old male with PMH signficant for TBI s/p facial reconstruction, asthma.  Presented with L knee pain for 2 days. He was at [facility] yesterday and was diagnosed with bursitis. He came back due to worsening swelling and pain. He is febrile, tachycardiac and has a leukocytosis. ED attempted to collect pus for culture but wasn't successful. Ortho is consulted and recommended MRI and abx.   Existing Precautions/Restrictions fall   Left Lower Extremity (Weight-bearing Status) weight-bearing as tolerated (WBAT)   Cognitive Status Examination   Orientation Status orientation to person, place and time   Affect/Mental Status (Cognitive) WFL   Follows Commands WFL   Visual Perception   Visual Impairment/Limitations corrective lenses for reading   Posture   Posture not  impaired   Range of Motion Comprehensive   General Range of Motion no range of motion deficits identified   Strength Comprehensive (MMT)   General Manual Muscle Testing (MMT) Assessment no strength deficits identified   Bed Mobility   Bed Mobility supine-sit   Supine-Sit Assumption (Bed Mobility) modified independence   Assistive Device (Bed Mobility) bed rails   Transfers   Transfers sit-stand transfer;toilet transfer   Sit-Stand Transfer   Sit-Stand Assumption (Transfers) supervision   Toilet Transfer   Assumption Level (Toilet Transfer) supervision   Balance   Balance Assessment standing dynamic balance   Standing Balance: Dynamic supervision   Activities of Daily Living   BADL Assessment/Intervention lower body dressing;toileting   Lower Body Dressing Assessment/Training   Assumption Level (Lower Body Dressing) minimum assist (75% patient effort)   Toileting   Assumption Level (Toileting) supervision   Clinical Impression   Criteria for Skilled Therapeutic Interventions Met (OT) Yes, treatment indicated   OT Diagnosis Decreased ind with ADLs   Influenced by the following impairments Septic bursitis   OT Problem List-Impairments impacting ADL activity tolerance impaired;pain;mobility   Assessment of Occupational Performance 1-3 Performance Deficits   Identified Performance Deficits dressing, fxl transfers, gold'ding ADLs   Planned Therapy Interventions (OT) ADL retraining;transfer training   Clinical Decision Making Complexity (OT) problem focused assessment/low complexity   Risk & Benefits of therapy have been explained evaluation/treatment results reviewed;care plan/treatment goals reviewed;risks/benefits reviewed;current/potential barriers reviewed;patient   OT Total Evaluation Time   OT Eval, Low Complexity Minutes (67469) 8   OT Goals   Therapy Frequency (OT) One time eval and treatment   OT Predicted Duration/Target Date for Goal Attainment 09/23/24   OT Goals Lower Body Dressing;Toilet  Transfer/Toileting;Transfers   OT: Lower Body Dressing Supervision/stand-by assist;Goal Met   OT: Transfer Supervision/stand-by assist;Goal Met  (tub/shower transfer)   OT: Toilet Transfer/Toileting Independent;toilet transfer;cleaning and garment management;Goal Met   Self-Care/Home Management   Self-Care/Home Mgmt/ADL, Compensatory, Meal Prep Minutes (41627) 10   Symptoms Noted During/After Treatment (Meal Preparation/Planning Training) none   Treatment Detail/Skilled Intervention Eval completed, treatment initiated. Pt reporting decreased pain from yesterday, able to complete full ROM. Fxl mob to bathroom SBA with no AD, slight favoring of RLE. Cueing for hand placement and set up with toilet transfer, completed ind following cueing. Reviewed tech and sequencing for tub/transfer at home - completed simulated transfer SBA. Returned to EOB - instructed on threading painful LE first for dressing, able to complete ind following cueing. Returned to supine ind, no further OT needs.   OT Discharge Planning   OT Plan D/c OT   OT Discharge Recommendation (DC Rec) home   OT Rationale for DC Rec Pt mobilizing near baseline - reporting pain much improved, has assist from son if needed, all OT goal mets   OT Brief overview of current status SBA fxl mob no AD, SBA-ind ADLs   Total Session Time   Timed Code Treatment Minutes 10   Total Session Time (sum of timed and untimed services) 18

## 2024-09-24 ENCOUNTER — PATIENT OUTREACH (OUTPATIENT)
Dept: CARE COORDINATION | Facility: CLINIC | Age: 44
End: 2024-09-24
Payer: COMMERCIAL

## 2024-09-24 LAB
ANION GAP SERPL CALCULATED.3IONS-SCNC: 14 MMOL/L (ref 7–15)
BUN SERPL-MCNC: 8 MG/DL (ref 6–20)
CALCIUM SERPL-MCNC: 8.6 MG/DL (ref 8.8–10.4)
CHLORIDE SERPL-SCNC: 106 MMOL/L (ref 98–107)
CREAT SERPL-MCNC: 0.83 MG/DL (ref 0.67–1.17)
EGFRCR SERPLBLD CKD-EPI 2021: >90 ML/MIN/1.73M2
ERYTHROCYTE [DISTWIDTH] IN BLOOD BY AUTOMATED COUNT: 12.8 % (ref 10–15)
GLUCOSE SERPL-MCNC: 144 MG/DL (ref 70–99)
HCO3 SERPL-SCNC: 18 MMOL/L (ref 22–29)
HCT VFR BLD AUTO: 41.6 % (ref 40–53)
HGB BLD-MCNC: 14.4 G/DL (ref 13.3–17.7)
MCH RBC QN AUTO: 31.6 PG (ref 26.5–33)
MCHC RBC AUTO-ENTMCNC: 34.6 G/DL (ref 31.5–36.5)
MCV RBC AUTO: 91 FL (ref 78–100)
PLATELET # BLD AUTO: 102 10E3/UL (ref 150–450)
POTASSIUM SERPL-SCNC: 3.9 MMOL/L (ref 3.4–5.3)
RBC # BLD AUTO: 4.56 10E6/UL (ref 4.4–5.9)
SODIUM SERPL-SCNC: 138 MMOL/L (ref 135–145)
VANCOMYCIN SERPL-MCNC: 5.6 UG/ML
WBC # BLD AUTO: 12.2 10E3/UL (ref 4–11)

## 2024-09-24 PROCEDURE — 258N000003 HC RX IP 258 OP 636: Performed by: STUDENT IN AN ORGANIZED HEALTH CARE EDUCATION/TRAINING PROGRAM

## 2024-09-24 PROCEDURE — 250N000011 HC RX IP 250 OP 636: Performed by: STUDENT IN AN ORGANIZED HEALTH CARE EDUCATION/TRAINING PROGRAM

## 2024-09-24 PROCEDURE — 36415 COLL VENOUS BLD VENIPUNCTURE: CPT | Performed by: STUDENT IN AN ORGANIZED HEALTH CARE EDUCATION/TRAINING PROGRAM

## 2024-09-24 PROCEDURE — 250N000013 HC RX MED GY IP 250 OP 250 PS 637: Performed by: STUDENT IN AN ORGANIZED HEALTH CARE EDUCATION/TRAINING PROGRAM

## 2024-09-24 PROCEDURE — 85027 COMPLETE CBC AUTOMATED: CPT | Performed by: STUDENT IN AN ORGANIZED HEALTH CARE EDUCATION/TRAINING PROGRAM

## 2024-09-24 PROCEDURE — 120N000001 HC R&B MED SURG/OB

## 2024-09-24 PROCEDURE — 250N000011 HC RX IP 250 OP 636: Performed by: EMERGENCY MEDICINE

## 2024-09-24 PROCEDURE — 80202 ASSAY OF VANCOMYCIN: CPT | Performed by: STUDENT IN AN ORGANIZED HEALTH CARE EDUCATION/TRAINING PROGRAM

## 2024-09-24 PROCEDURE — 80048 BASIC METABOLIC PNL TOTAL CA: CPT | Performed by: STUDENT IN AN ORGANIZED HEALTH CARE EDUCATION/TRAINING PROGRAM

## 2024-09-24 PROCEDURE — 99232 SBSQ HOSP IP/OBS MODERATE 35: CPT | Performed by: STUDENT IN AN ORGANIZED HEALTH CARE EDUCATION/TRAINING PROGRAM

## 2024-09-24 RX ORDER — VANCOMYCIN 1.75 GRAM/500 ML IN 0.9 % SODIUM CHLORIDE INTRAVENOUS
1750 EVERY 12 HOURS
Status: DISCONTINUED | OUTPATIENT
Start: 2024-09-24 | End: 2024-09-25

## 2024-09-24 RX ORDER — ENOXAPARIN SODIUM 100 MG/ML
40 INJECTION SUBCUTANEOUS EVERY 24 HOURS
Status: DISCONTINUED | OUTPATIENT
Start: 2024-09-24 | End: 2024-09-25 | Stop reason: HOSPADM

## 2024-09-24 RX ORDER — SODIUM CHLORIDE 9 MG/ML
INJECTION, SOLUTION INTRAVENOUS CONTINUOUS
Status: DISCONTINUED | OUTPATIENT
Start: 2024-09-24 | End: 2024-09-25 | Stop reason: HOSPADM

## 2024-09-24 RX ADMIN — VANCOMYCIN HYDROCHLORIDE 1750 MG: 5 INJECTION, POWDER, LYOPHILIZED, FOR SOLUTION INTRAVENOUS at 17:03

## 2024-09-24 RX ADMIN — IBUPROFEN 400 MG: 400 TABLET ORAL at 06:08

## 2024-09-24 RX ADMIN — POLYETHYLENE GLYCOL 3350 17 G: 17 POWDER, FOR SOLUTION ORAL at 08:53

## 2024-09-24 RX ADMIN — ACETAMINOPHEN 650 MG: 325 TABLET ORAL at 09:07

## 2024-09-24 RX ADMIN — DOCUSATE SODIUM 100 MG: 100 CAPSULE, LIQUID FILLED ORAL at 08:53

## 2024-09-24 RX ADMIN — OXYCODONE HYDROCHLORIDE 2.5 MG: 5 TABLET ORAL at 00:26

## 2024-09-24 RX ADMIN — PIPERACILLIN AND TAZOBACTAM 3.38 G: 3; .375 INJECTION, POWDER, FOR SOLUTION INTRAVENOUS at 11:26

## 2024-09-24 RX ADMIN — OXYCODONE HYDROCHLORIDE 2.5 MG: 5 TABLET ORAL at 15:58

## 2024-09-24 RX ADMIN — BUDESONIDE AND FORMOTEROL FUMARATE DIHYDRATE 2 PUFF: 160; 4.5 AEROSOL RESPIRATORY (INHALATION) at 08:52

## 2024-09-24 RX ADMIN — DOCUSATE SODIUM 100 MG: 100 CAPSULE, LIQUID FILLED ORAL at 15:44

## 2024-09-24 RX ADMIN — ENOXAPARIN SODIUM 40 MG: 100 INJECTION SUBCUTANEOUS at 17:22

## 2024-09-24 RX ADMIN — IBUPROFEN 400 MG: 400 TABLET ORAL at 22:53

## 2024-09-24 RX ADMIN — PIPERACILLIN AND TAZOBACTAM 3.38 G: 3; .375 INJECTION, POWDER, FOR SOLUTION INTRAVENOUS at 19:00

## 2024-09-24 RX ADMIN — BUDESONIDE AND FORMOTEROL FUMARATE DIHYDRATE 2 PUFF: 160; 4.5 AEROSOL RESPIRATORY (INHALATION) at 20:34

## 2024-09-24 RX ADMIN — POLYETHYLENE GLYCOL 3350 17 G: 17 POWDER, FOR SOLUTION ORAL at 15:45

## 2024-09-24 RX ADMIN — ACETAMINOPHEN 650 MG: 325 TABLET ORAL at 20:41

## 2024-09-24 RX ADMIN — SODIUM CHLORIDE: 9 INJECTION, SOLUTION INTRAVENOUS at 17:02

## 2024-09-24 RX ADMIN — VANCOMYCIN HYDROCHLORIDE 1000 MG: 1 INJECTION, SOLUTION INTRAVENOUS at 08:53

## 2024-09-24 RX ADMIN — IBUPROFEN 400 MG: 400 TABLET ORAL at 14:53

## 2024-09-24 RX ADMIN — PIPERACILLIN AND TAZOBACTAM 3.38 G: 3; .375 INJECTION, POWDER, FOR SOLUTION INTRAVENOUS at 02:25

## 2024-09-24 ASSESSMENT — ACTIVITIES OF DAILY LIVING (ADL)
DIFFICULTY_COMMUNICATING: NO
ADLS_ACUITY_SCORE: 24
ADLS_ACUITY_SCORE: 39
HEARING_DIFFICULTY_OR_DEAF: NO
CHANGE_IN_FUNCTIONAL_STATUS_SINCE_ONSET_OF_CURRENT_ILLNESS/INJURY: NO
ADLS_ACUITY_SCORE: 24
ADLS_ACUITY_SCORE: 41
ADLS_ACUITY_SCORE: 24
ADLS_ACUITY_SCORE: 24
ADLS_ACUITY_SCORE: 41
WEAR_GLASSES_OR_BLIND: NO
ADLS_ACUITY_SCORE: 41
ADLS_ACUITY_SCORE: 24
CONCENTRATING,_REMEMBERING_OR_MAKING_DECISIONS_DIFFICULTY: NO
ADLS_ACUITY_SCORE: 41
ADLS_ACUITY_SCORE: 24
ADLS_ACUITY_SCORE: 41
ADLS_ACUITY_SCORE: 24
ADLS_ACUITY_SCORE: 24
WALKING_OR_CLIMBING_STAIRS_DIFFICULTY: NO
ADLS_ACUITY_SCORE: 39
ADLS_ACUITY_SCORE: 24
TOILETING_ISSUES: NO
DRESSING/BATHING_DIFFICULTY: NO
ADLS_ACUITY_SCORE: 41
ADLS_ACUITY_SCORE: 41
FALL_HISTORY_WITHIN_LAST_SIX_MONTHS: NO
ADLS_ACUITY_SCORE: 41
ADLS_ACUITY_SCORE: 24
ADLS_ACUITY_SCORE: 41
DOING_ERRANDS_INDEPENDENTLY_DIFFICULTY: NO
DIFFICULTY_EATING/SWALLOWING: NO

## 2024-09-24 NOTE — PLAN OF CARE
Problem: Adult Inpatient Plan of Care  Goal: Absence of Hospital-Acquired Illness or Injury  Intervention: Identify and Manage Fall Risk  Problem: Pain Acute  Goal: Optimal Pain Control and Function  Intervention: Develop Pain Management Plan  Intervention: Prevent or Manage Pain  Intervention: Optimize Psychosocial Wellbeing   Problem: Adult Inpatient Plan of Care  Goal: Absence of Hospital-Acquired Illness or Injury  Intervention: Prevent Infection  Goal Outcome Evaluation:  Patient is alert and oriented X 4. Scheduled Ibuprofen and PRN Oxycodone administered for pain control. . LR on hold while Zosyn running per MD. Voided adequate amount. VSS on RA. Call light within reach.

## 2024-09-24 NOTE — PROGRESS NOTES
[facility]    Medicine Progress Note - Hospitalist Service    Date of Admission:  9/22/2024    Assessment & Plan   Augustine Matthews is a 44 year old male with PMH signficant for TBI s/p facial reconstruction, asthma.  Presented with L knee pain for 2 days. He was at [facility] yesterday and was diagnosed with bursitis. He came back due to worsening swelling and pain.  Presentation, he was febrile, tachycardic with leukocytosis. ED attempted to collect pus for culture but wasn't successful.  Ortho consulted, recommended MRI which did not show any large knee joint effusion.  Redemonstrated possible prepatellar bursitis. patient was initiated on vancomycin and Zosyn on presentation, leukocytosis resolving, symptoms slowly improving.  Pending evaluation by infectious disease.    Sepsis  L knee septic bursitis vs septic arthritis  Presented with 2 days of left knee pain.  Denies any history of gout or pseudogout  MRI on 9/23 showed moderate prepatellar soft tissue swelling possible trace bursitis. Low-grade patellofemoral compartment chondromalacia.Chronic fragmentation of the tibial tuberosity.  No knee joint effusion.  The etiology of his left knee pain remains unclear, possibly septic bursitis, less likely septic arthritis.  Did not obtain any fluid sample from the bursa so far.  On presentation  fever, leukocytosis which his resolving.    Plan  Continue vancomycin and Zosyn.  Consults infectious disease  We will most likely discharge on oral antibiotics such as doxy for 10-14   Follow-up with blood cultures  Switch LR to normal saline at 100 cc/h       Thrombocytopenia  Monitor on ibuprofen     Asthma  Says he uses inhalers twice a day  Continue albuterol and LABA-ICS as needed          Diet: Regular Diet Adult    DVT Prophylaxis: Enoxaparin (Lovenox) SQ  Flanagan Catheter: Not present  Lines: None     Cardiac Monitoring: None  Code Status: Full Code      Clinically Significant Risk Factors          # Hyponatremia: Lowest Na = 134 mmol/L in last 2 days, will monitor as appropriate  # Hypocalcemia: Lowest Ca = 8.6 mg/dL in last 2 days, will monitor and replace as appropriate     # Hypoalbuminemia: Lowest albumin = 3.4 g/dL at 9/23/2024  7:26 AM, will monitor as appropriate   # Thrombocytopenia: Lowest platelets = 102 in last 2 days, will monitor for bleeding                 # Asthma: noted on problem list        Disposition Plan     Medically Ready for Discharge: Anticipated Tomorrow             JOSE BRANDON MD  Hospitalist Service  [facility]  Securely message with [employer] (more info)  Text page via [email or web address] Paging/Directory   ______________________________________________________________________    Interval History   No significant events.  Reported that his left knee pain is slowly improving.  Denies any chest pain shortness of breath.    Physical Exam   Vital Signs: Temp: (!) 96.7  F (35.9  C) Temp src: Axillary BP: (!) 138/92 Pulse: 95   Resp: 17 SpO2: 97 % O2 Device: None (Room air)    Weight: 150 lbs 0 oz    Physical Exam  Constitutional:       General: He is not in acute distress.     Appearance: He is not toxic-appearing.   Cardiovascular:      Rate and Rhythm: Tachycardia present.   Pulmonary:      Effort: Pulmonary effort is normal. No respiratory distress.   Musculoskeletal:        Legs:       Comments: Erythema, warmth, swelling, tenderness to palpation of the left patella.   Neurological:      Mental Status: He is alert.   Psychiatric:         Mood and Affect: Mood normal.          Medical Decision Making       48 MINUTES SPENT BY ME on the date of service doing chart review, history, exam, documentation & further activities per the note.      Data     I have personally reviewed the following data over the past 24 hrs:    12.2 (H)  \   14.4   / 102 (L)     138 106 8.0 /  144 (H)   3.9 18 (L) 0.83 \       Imaging results reviewed over the past 24 hrs:   Recent Results (from  the past 24 hour(s))   MR Knee Left w/o Contrast    Narrative    EXAM: MR KNEE LEFT W/O CONTRAST  LOCATION: [facility]  DATE: 9/23/2024    INDICATION: knee swelling, eval for fluid collection  COMPARISON: Radiographs from 9/22/2024  TECHNIQUE: Unenhanced.    FINDINGS:    MEDIAL COMPARTMENT:   -Meniscus: There is subtle linear increased intrameniscal signal at the posterior horn which does not extend to the meniscal surface. This does not meet MRI criteria for tear.  -Cartilage: Normal.    LATERAL COMPARTMENT:  -Meniscus: Normal.   -Cartilage: Normal.    PATELLOFEMORAL COMPARTMENT:   -Alignment: Mild lateral patellar tilt and subluxation.  -Cartilage: Low-grade cartilage thinning in the patella with a deep cartilage fissure at the median ridge.    CRUCIATE LIGAMENTS:   -ACL: Normal.  -PCL: Normal.    COLLATERAL LIGAMENTS:   -Medial collateral ligament: Superficial and deep fibers are normal.  -Lateral collateral ligament: Normal.    POSTEROMEDIAL CORNER:  -Distal semimembranosus tendon is normal.   -Pes anserine tendons are normal. Posteromedial corner complex ligaments are intact.    POSTEROLATERAL CORNER:   -Popliteal tendon is intact. No tendinopathy.  -Biceps femoris tendon and posterolateral corner complex ligaments are intact.    EXTENSOR MECHANISM:   -Quadriceps tendon: Normal.  -Patellar tendon: Normal.  -Patellofemoral ligaments and retinacula: Intact.    JOINT:   -No joint effusion or synovitis.    BONES:  -Chronic fragmentation of the tibial tuberosity. No acute fracture or concerning marrow replacing lesion.    SOFT TISSUES:   -No significant popliteal cyst. There is moderate prepatellar soft tissue swelling. No significant fluid collection. No acute muscular injury or soft tissue mass.       Impression    IMPRESSION:  1.  There is moderate prepatellar soft tissue swelling. Cannot exclude trace bursitis.   2.  Intact cruciate ligaments, collateral ligaments, and menisci.  3.   Low-grade patellofemoral compartment chondromalacia.  4.  Mild lateral patellar tilt and subluxation.  5.  Chronic fragmentation of the tibial tuberosity.   6.  No knee joint effusion.

## 2024-09-24 NOTE — PROGRESS NOTES
Clinic Care Coordination Contact  [facility]/Voicemail    Clinical Data: Care Coordinator Outreach        Left message on patient's voicemail with call back information and requested return call.    Plan: Care Coordinator  via . Care Coordinator will try to reach patient again in 1-2 business days.

## 2024-09-24 NOTE — PHARMACY-VANCOMYCIN DOSING SERVICE
"Pharmacy Vancomycin Note  Date of Service 2024  Patient's  1980   44 year old, male    Indication: Bone and Joint Infection  Day of Therapy: 3  Current vancomycin regimen:  1000 mg IV q12h  Current vancomycin monitoring method: AUC  Current vancomycin therapeutic monitoring goal: 400-600 mg*h/L    InsightRX Prediction of Current Vancomycin Regimen  Regimen: 1000 mg IV every 12 hours.  Start time: 20:53 on 2024  Exposure target: AUC24 (range)400-600 mg/L.hr   AUC24,ss: 291 mg/L.hr  Probability of AUC24 > 400: 2 %  Ctrough,ss: 5.7 mg/L  Probability of Ctrough,ss > 20: 0 %  Probability of nephrotoxicity (Lodise DEDE ): 4 %      Current estimated CrCl = Estimated Creatinine Clearance: 109.2 mL/min (based on SCr of 0.83 mg/dL).    Creatinine for last 3 days  2024:  6:23 PM Creatinine 1.01 mg/dL  2024:  7:26 AM Creatinine 0.99 mg/dL  2024:  7:53 AM Creatinine 0.83 mg/dL    Recent Vancomycin Levels (past 3 days)  2024:  7:53 AM Vancomycin 5.6 ug/mL    Vancomycin IV Administrations (past 72 hours)                     vancomycin (VANCOCIN) 1,000 mg in NaCl 0.9% 200 mL intermittent infusion (mg) 1,000 mg Given 24 0853     1,000 mg Given 249     1,000 mg Given  0853    vancomycin (VANCOCIN) 1,500 mg in 0.9% NaCl 250 mL intermittent infusion (mg) 1,500 mg New Bag 24                    Nephrotoxins and other renal medications (From now, onward)      Start     Dose/Rate Route Frequency Ordered Stop    24 0900  vancomycin (VANCOCIN) 1,000 mg in NaCl 0.9% 200 mL intermittent infusion         1,000 mg  over 60 Minutes Intravenous EVERY 12 HOURS 24 0230  piperacillin-tazobactam (ZOSYN) 3.375 g vial to attach to  mL bag        Note to Pharmacy: For [facility], [facility] and [facility]: For Zosyn-naive patients, use the \"Zosyn initial dose + extended infusion\" order panel.    3.375 g  over 240 Minutes Intravenous EVERY 8 HOURS 24   "    09/22/24 2200  ibuprofen (ADVIL/MOTRIN) tablet 400 mg         400 mg Oral EVERY 8 HOURS 09/22/24 2035                 Contrast Orders - past 72 hours (72h ago, onward)      None            Interpretation of levels and current regimen:  Vancomycin level is reflective of AUC less than 400    Has serum creatinine changed greater than 50% in last 72 hours: No    Urine output:  good urine output    Renal Function: Stable    InsightRX Prediction of Planned New Vancomycin Regimen  Regimen: 1750 mg IV every 12 hours.  Start time: 20:53 on 09/24/2024  Exposure target: AUC24 (range)400-600 mg/L.hr   AUC24,ss: 508 mg/L.hr  Probability of AUC24 > 400: 93 %  Ctrough,ss: 10.3 mg/L  Probability of Ctrough,ss > 20: 0 %  Probability of nephrotoxicity (Lodise DEDE 2009): 6 %    Plan:  Increase Dose to 1750mg q12h  Vancomycin monitoring method: AUC  Vancomycin therapeutic monitoring goal: 400-600 mg*h/L  Pharmacy will check vancomycin levels as appropriate in 1-3 Days.  Serum creatinine levels will be ordered daily for the first week of therapy and at least twice weekly for subsequent weeks.    Roseanna Kang [facility]

## 2024-09-24 NOTE — PLAN OF CARE
Problem: Adult Inpatient Plan of Care  Goal: Absence of Hospital-Acquired Illness or Injury  Intervention: Identify and Manage Fall Risk  Recent Flowsheet Documentation  Taken 9/24/2024 0845 by Leah Willams, RN  Safety Promotion/Fall Prevention: safety round/check completed     Problem: Adult Inpatient Plan of Care  Goal: Absence of Hospital-Acquired Illness or Injury  Intervention: Prevent Infection  Recent Flowsheet Documentation  Taken 9/24/2024 0845 by Leah Willams, RN  Infection Prevention: hand hygiene promoted     Problem: Adult Inpatient Plan of Care  Goal: Optimal Comfort and Wellbeing  Intervention: Monitor Pain and Promote Comfort  Recent Flowsheet Documentation  Taken 9/24/2024 0907 by Leah Willams, RN  Pain Management Interventions: medication (see MAR)     Problem: Pain Acute  Goal: Optimal Pain Control and Function  Outcome: Progressing  Intervention: Develop Pain Management Plan  Recent Flowsheet Documentation  Taken 9/24/2024 0907 by Leah Willams, RN  Pain Management Interventions: medication (see MAR)  Intervention: Prevent or Manage Pain  Recent Flowsheet Documentation  Taken 9/24/2024 0845 by Leah Willams, RN  Medication Review/Management: medications reviewed  Intervention: Optimize Psychosocial Wellbeing  Recent Flowsheet Documentation  Taken 9/24/2024 0845 by Leah Willams, RN  Supportive Measures: active listening utilized     Problem: Pain Acute  Goal: Optimal Pain Control and Function  Intervention: Develop Pain Management Plan  Recent Flowsheet Documentation  Taken 9/24/2024 0907 by Leah Willams, RN  Pain Management Interventions: medication (see MAR)     Problem: Fall Injury Risk  Goal: Absence of Fall and Fall-Related Injury  Intervention: Identify and Manage Contributors  Recent Flowsheet Documentation  Taken 9/24/2024 0845 by Leah Willams, RN  Medication Review/Management: medications reviewed   Goal Outcome Evaluation:       Patient pain is being controlled with Tylenol and ice. Patient is  independent in room. Vital signs stable. No signs of infection. Sleeping on and off.Patient still on IV ABX. Labs improving.  HLM Admission: 8- Walk 250 feet or more  HLM Daily8- Walk 250 feet or more

## 2024-09-24 NOTE — PLAN OF CARE

## 2024-09-25 ENCOUNTER — PATIENT OUTREACH (OUTPATIENT)
Dept: CARE COORDINATION | Facility: CLINIC | Age: 44
End: 2024-09-25
Payer: COMMERCIAL

## 2024-09-25 VITALS
HEIGHT: 69 IN | RESPIRATION RATE: 16 BRPM | OXYGEN SATURATION: 99 % | TEMPERATURE: 97.7 F | BODY MASS INDEX: 22.22 KG/M2 | SYSTOLIC BLOOD PRESSURE: 146 MMHG | HEART RATE: 69 BPM | DIASTOLIC BLOOD PRESSURE: 87 MMHG | WEIGHT: 150 LBS

## 2024-09-25 LAB
ANION GAP SERPL CALCULATED.3IONS-SCNC: 13 MMOL/L (ref 7–15)
BASOPHILS # BLD AUTO: 0 10E3/UL (ref 0–0.2)
BASOPHILS NFR BLD AUTO: 0 %
BUN SERPL-MCNC: 8.2 MG/DL (ref 6–20)
CALCIUM SERPL-MCNC: 8.7 MG/DL (ref 8.8–10.4)
CHLORIDE SERPL-SCNC: 108 MMOL/L (ref 98–107)
CREAT SERPL-MCNC: 0.87 MG/DL (ref 0.67–1.17)
EGFRCR SERPLBLD CKD-EPI 2021: >90 ML/MIN/1.73M2
EOSINOPHIL # BLD AUTO: 0.3 10E3/UL (ref 0–0.7)
EOSINOPHIL NFR BLD AUTO: 3 %
ERYTHROCYTE [DISTWIDTH] IN BLOOD BY AUTOMATED COUNT: 12.4 % (ref 10–15)
GLUCOSE SERPL-MCNC: 103 MG/DL (ref 70–99)
HCO3 SERPL-SCNC: 19 MMOL/L (ref 22–29)
HCT VFR BLD AUTO: 42.3 % (ref 40–53)
HGB BLD-MCNC: 14.8 G/DL (ref 13.3–17.7)
IMM GRANULOCYTES # BLD: 0.3 10E3/UL
IMM GRANULOCYTES NFR BLD: 3 %
LYMPHOCYTES # BLD AUTO: 1.6 10E3/UL (ref 0.8–5.3)
LYMPHOCYTES NFR BLD AUTO: 16 %
MCH RBC QN AUTO: 31.4 PG (ref 26.5–33)
MCHC RBC AUTO-ENTMCNC: 35 G/DL (ref 31.5–36.5)
MCV RBC AUTO: 90 FL (ref 78–100)
MONOCYTES # BLD AUTO: 0.7 10E3/UL (ref 0–1.3)
MONOCYTES NFR BLD AUTO: 7 %
NEUTROPHILS # BLD AUTO: 7.3 10E3/UL (ref 1.6–8.3)
NEUTROPHILS NFR BLD AUTO: 72 %
NRBC # BLD AUTO: 0 10E3/UL
NRBC BLD AUTO-RTO: 0 /100
PLATELET # BLD AUTO: 116 10E3/UL (ref 150–450)
POTASSIUM SERPL-SCNC: 4.1 MMOL/L (ref 3.4–5.3)
RBC # BLD AUTO: 4.72 10E6/UL (ref 4.4–5.9)
SODIUM SERPL-SCNC: 140 MMOL/L (ref 135–145)
WBC # BLD AUTO: 10.1 10E3/UL (ref 4–11)

## 2024-09-25 PROCEDURE — 85025 COMPLETE CBC W/AUTO DIFF WBC: CPT | Performed by: STUDENT IN AN ORGANIZED HEALTH CARE EDUCATION/TRAINING PROGRAM

## 2024-09-25 PROCEDURE — 250N000013 HC RX MED GY IP 250 OP 250 PS 637

## 2024-09-25 PROCEDURE — 250N000011 HC RX IP 250 OP 636: Performed by: STUDENT IN AN ORGANIZED HEALTH CARE EDUCATION/TRAINING PROGRAM

## 2024-09-25 PROCEDURE — 258N000003 HC RX IP 258 OP 636: Performed by: STUDENT IN AN ORGANIZED HEALTH CARE EDUCATION/TRAINING PROGRAM

## 2024-09-25 PROCEDURE — 36415 COLL VENOUS BLD VENIPUNCTURE: CPT | Performed by: STUDENT IN AN ORGANIZED HEALTH CARE EDUCATION/TRAINING PROGRAM

## 2024-09-25 PROCEDURE — G0008 ADMIN INFLUENZA VIRUS VAC: HCPCS | Performed by: STUDENT IN AN ORGANIZED HEALTH CARE EDUCATION/TRAINING PROGRAM

## 2024-09-25 PROCEDURE — 99239 HOSP IP/OBS DSCHRG MGMT >30: CPT | Performed by: STUDENT IN AN ORGANIZED HEALTH CARE EDUCATION/TRAINING PROGRAM

## 2024-09-25 PROCEDURE — 90656 IIV3 VACC NO PRSV 0.5 ML IM: CPT | Performed by: STUDENT IN AN ORGANIZED HEALTH CARE EDUCATION/TRAINING PROGRAM

## 2024-09-25 PROCEDURE — 250N000013 HC RX MED GY IP 250 OP 250 PS 637: Performed by: STUDENT IN AN ORGANIZED HEALTH CARE EDUCATION/TRAINING PROGRAM

## 2024-09-25 PROCEDURE — 99222 1ST HOSP IP/OBS MODERATE 55: CPT

## 2024-09-25 PROCEDURE — 80048 BASIC METABOLIC PNL TOTAL CA: CPT | Performed by: STUDENT IN AN ORGANIZED HEALTH CARE EDUCATION/TRAINING PROGRAM

## 2024-09-25 RX ORDER — CEFDINIR 300 MG/1
300 CAPSULE ORAL EVERY 12 HOURS SCHEDULED
Status: DISCONTINUED | OUTPATIENT
Start: 2024-09-25 | End: 2024-09-25 | Stop reason: HOSPADM

## 2024-09-25 RX ORDER — DOXYCYCLINE 100 MG/1
100 CAPSULE ORAL EVERY 12 HOURS SCHEDULED
Status: DISCONTINUED | OUTPATIENT
Start: 2024-09-25 | End: 2024-09-25 | Stop reason: HOSPADM

## 2024-09-25 RX ORDER — CEFDINIR 300 MG/1
300 CAPSULE ORAL 2 TIMES DAILY
Qty: 20 CAPSULE | Refills: 0 | Status: SHIPPED | OUTPATIENT
Start: 2024-09-25 | End: 2024-10-05

## 2024-09-25 RX ORDER — ACETAMINOPHEN 325 MG/1
650 TABLET ORAL EVERY 6 HOURS PRN
Qty: 30 TABLET | Refills: 0 | Status: SHIPPED | OUTPATIENT
Start: 2024-09-25

## 2024-09-25 RX ORDER — DOXYCYCLINE 100 MG/1
100 CAPSULE ORAL 2 TIMES DAILY
Qty: 20 CAPSULE | Refills: 0 | Status: SHIPPED | OUTPATIENT
Start: 2024-09-25 | End: 2024-10-05

## 2024-09-25 RX ADMIN — DOCUSATE SODIUM 100 MG: 100 CAPSULE, LIQUID FILLED ORAL at 09:06

## 2024-09-25 RX ADMIN — ACETAMINOPHEN 650 MG: 325 TABLET ORAL at 09:12

## 2024-09-25 RX ADMIN — POLYETHYLENE GLYCOL 3350 17 G: 17 POWDER, FOR SOLUTION ORAL at 09:06

## 2024-09-25 RX ADMIN — DOXYCYCLINE HYCLATE 100 MG: 100 CAPSULE ORAL at 10:40

## 2024-09-25 RX ADMIN — VANCOMYCIN HYDROCHLORIDE 1750 MG: 5 INJECTION, POWDER, LYOPHILIZED, FOR SOLUTION INTRAVENOUS at 02:35

## 2024-09-25 RX ADMIN — BUDESONIDE AND FORMOTEROL FUMARATE DIHYDRATE 2 PUFF: 160; 4.5 AEROSOL RESPIRATORY (INHALATION) at 09:06

## 2024-09-25 RX ADMIN — INFLUENZA A VIRUS A/VICTORIA/4897/2022 IVR-238 (H1N1) ANTIGEN (FORMALDEHYDE INACTIVATED), INFLUENZA A VIRUS A/CALIFORNIA/122/2022 SAN-022 (H3N2) ANTIGEN (FORMALDEHYDE INACTIVATED), AND INFLUENZA B VIRUS B/MICHIGAN/01/2021 ANTIGEN (FORMALDEHYDE INACTIVATED) 0.5 ML: 15; 15; 15 INJECTION, SUSPENSION INTRAMUSCULAR at 10:48

## 2024-09-25 RX ADMIN — IBUPROFEN 400 MG: 400 TABLET ORAL at 06:27

## 2024-09-25 RX ADMIN — CEFDINIR 300 MG: 300 CAPSULE ORAL at 10:40

## 2024-09-25 RX ADMIN — PIPERACILLIN AND TAZOBACTAM 3.38 G: 3; .375 INJECTION, POWDER, FOR SOLUTION INTRAVENOUS at 02:35

## 2024-09-25 ASSESSMENT — ACTIVITIES OF DAILY LIVING (ADL)
DEPENDENT_IADLS:: INDEPENDENT
ADLS_ACUITY_SCORE: 24

## 2024-09-25 NOTE — CONSULTS
Consultation - [facility]  Augustine Matthews,  1980, MRN [id number]    [facility]  Septic bursitis [M71.10]    PCP: [facility] - [place], [facility], [phone]   Code status:  Full Code       Extended Emergency Contact Information  Primary Emergency Contact: LYNDA SOUTH  Mobile Phone: [phone]  Relation: Son       Assessment and Plan   Active Problems:    Septic bursitis    Impression: Left knee pain.  Concern for septic bursitis versus cellulitis versus both.    MRI did not show any knee joint effusion.    Recommendations: Okay to dismiss home anytime from infectious disease standpoint on doxycycline 100 mg p.o. twice daily and cefdinir 300 mg p.o. twice daily both for 10 days.    Time consult only    Thank you for consulting [facility]    Adan Talbot MD  [phone]     Chief Complaint <principal problem not specified>       HPI   We have been requested by Keyur Ahmadi MD to evaluate Augustine Matthews for left knee pain who is a 44 year old year old male.    Patient is a 44-year-old gentleman who developed left knee pain 3 to 4 days ago.  Initially presented to [facility] emergency department where he was noted to have somewhat elevated white count but normal CRP.  He was sent home on a course of cephalexin 500 mg p.o. 4 times daily.    Patient then presented to [facility] emergency department the next day with increasing pain redness and swelling of the left knee.  Aspiration was attempted but was unsuccessful.    He was noted to have elevated white count and fever.  He was admitted to the hospital started on intravenous piperacillin/tazobactam and vancomycin.    Over the last 3 days he is steadily improved.  He still has some erythema on the left knee.  Blood culture remains negative , though he did have a positive nares PCR for Staph aureus, not MRSA.         Medical  History  Patient Active Problem List   Diagnosis    Moderate persistent asthma without complication    History of ADHD    Septic bursitis     Past Medical History:   Diagnosis Date    Uncomplicated asthma     Surgical History  He  has a past surgical history that includes ENT surgery; hernia repair; Thoracoscopic pleurodesis; REMOVAL PARIETAL PLEURA; and Inguinal Hernia Repair.   Social History  Reviewed, and he  reports that he has quit smoking. He has never used smokeless tobacco. He reports that he does not drink alcohol and does not use drugs.   Allergies  Allergies   Allergen Reactions    No Known Allergies     Family History  Noncontributory to current problem except as mentioned above    Psychosocial Needs  Social History     Social History Narrative    Not on file     Additional psychosocial needs reviewed per nursing assessment.     Prior to Admission Medications   Medications Prior to Admission   Medication Sig Dispense Refill Last Dose    albuterol (PROVENTIL) (2.5 MG/3ML) 0.083% neb solution Take 1 vial (2.5 mg) by nebulization every 6 hours as needed for shortness of breath, wheezing or cough 90 mL 1 9/22/2024 at AM    budesonide-formoterol (SYMBICORT) 160-4.5 MCG/ACT Inhaler INHALE 2 PUFFS BY MOUTH TWICE DAILY PLUS 1 TO 2 PUFFS AS NEEDED MAY USE UP TO 12 PUFFS PER DAY 20.4 g 1 9/22/2024 at AM    carbamide peroxide (DEBROX) 6.5 % otic solution Place 5 drops in ear(s)   Past Month at [facility]    cephALEXin (KEFLEX) 500 MG capsule Take 500 mg by mouth 4 times daily. For 7 days   9/22/2024 at 2 of 4 4pm    ibuprofen (ADVIL/MOTRIN) 800 MG tablet TAKE 1 TABLET(800 MG) BY MOUTH EVERY 8 HOURS AS NEEDED FOR MODERATE PAIN 100 tablet 0 Past Week at PRN    lidocaine (XYLOCAINE) 5 % external ointment Apply topically daily as needed for moderate pain 240 g 1 Past Month at [facility]    sulfamethoxazole-trimethoprim (BACTRIM DS) 800-160 MG tablet Take 2 tablets by mouth 2 times daily. For 7 days   9/22/2024 at 1 tab at 4pm       "    Review of Systems:  Pertinent items are noted in HPI., otherwise all others negative. Physical Exam:  Temp:  [97.7  F (36.5  C)-98.1  F (36.7  C)] 97.7  F (36.5  C)  Pulse:  [69-91] 69  Resp:  [16-20] 16  BP: (139-146)/(87-95) 146/87  SpO2:  [97 %-99 %] 99 %    BP (!) 146/87 (BP Location: Right arm)   Pulse 69   Temp 97.7  F (36.5  C) (Oral)   Resp 16   Ht 1.753 m (5' 9\")   Wt 68 kg (150 lb)   SpO2 99%   BMI 22.15 kg/m    General appearance: alert, appears stated age, and sleepy  Head: Normocephalic, without obvious abnormality, atraumatic  Eyes: Extraocular muscles intact, no icterus  Neck: no adenopathy and supple, symmetrical, trachea midline  Lungs: clear to auscultation bilaterally  Heart: Regular rate and rhythm, no murmur  Abdomen: soft, non-tender; bowel sounds normal; no masses,  no organomegaly  Extremities: Mild swelling and erythema noted on the left knee.  Skin: Several tattoos noted, otherwise unremarkable  Neurologic: Grossly normal       Pertinent Labs  Lab Results: personally reviewed.   WBC Count   Date/Time Value Ref Range Status   09/25/2024 06:14 AM 10.1 4.0 - 11.0 10e3/uL Final   09/24/2024 07:53 AM 12.2 (H) 4.0 - 11.0 10e3/uL Final   09/23/2024 07:26 AM 14.2 (H) 4.0 - 11.0 10e3/uL Final     Hemoglobin   Date/Time Value Ref Range Status   09/25/2024 06:14 AM 14.8 13.3 - 17.7 g/dL Final   09/24/2024 07:53 AM 14.4 13.3 - 17.7 g/dL Final   09/23/2024 07:26 AM 15.9 13.3 - 17.7 g/dL Final     Hematocrit   Date/Time Value Ref Range Status   09/25/2024 06:14 AM 42.3 40.0 - 53.0 % Final   09/24/2024 07:53 AM 41.6 40.0 - 53.0 % Final   09/23/2024 07:26 AM 45.9 40.0 - 53.0 % Final     Platelet Count   Date/Time Value Ref Range Status   09/25/2024 06:14  (L) 150 - 450 10e3/uL Final   09/24/2024 07:53  (L) 150 - 450 10e3/uL Final   09/23/2024 07:26  (L) 150 - 450 10e3/uL Final        Sodium   Date/Time Value Ref Range Status   09/25/2024 06:14  135 - 145 mmol/L Final "   09/24/2024 07:53  135 - 145 mmol/L Final   09/23/2024 07:26  135 - 145 mmol/L Final   06/14/2012 04:53 .0 133.0 - 144.0 mmol/L Final     Carbon Dioxide   Date/Time Value Ref Range Status   06/14/2012 04:53 PM 27.0 20.0 - 32.0 mmol/L Final     Carbon Dioxide (CO2)   Date/Time Value Ref Range Status   09/25/2024 06:14 AM 19 (L) 22 - 29 mmol/L Final   09/24/2024 07:53 AM 18 (L) 22 - 29 mmol/L Final   09/23/2024 07:26 AM 21 (L) 22 - 29 mmol/L Final   01/03/2020 12:49 AM 27 22 - 31 mmol/L Final   09/06/2018 05:53 AM 26 22 - 31 mmol/L Final   09/05/2018 07:22 PM 27 22 - 31 mmol/L Final     Urea Nitrogen   Date/Time Value Ref Range Status   09/25/2024 06:14 AM 8.2 6.0 - 20.0 mg/dL Final   09/24/2024 07:53 AM 8.0 6.0 - 20.0 mg/dL Final   09/23/2024 07:26 AM 9.7 6.0 - 20.0 mg/dL Final   01/03/2020 12:49 AM 12 8 - 22 mg/dL Final   09/06/2018 05:53 AM 10 8 - 22 mg/dL Final   09/05/2018 07:22 PM 11 8 - 22 mg/dL Final   06/14/2012 04:53 PM 8.0 5.0 - 24.0 mg/dL Final     Creatinine   Date Value Ref Range Status   09/25/2024 0.87 0.67 - 1.17 mg/dL Final   06/14/2012 1.1 0.8 - 1.5 mg/dL Final       7-Day Micro Results       Collected Updated Procedure Result Status      09/22/2024 2054 09/23/2024 0129 MRSA MSSA PCR, Nasal Swab [[id number]]    Swab from Nares, Bilateral    Final result Component Value   MRSA Target DNA Negative   SA Target DNA Positive            09/22/2024 1823 09/24/2024 2131 Blood Culture Peripheral Blood [[id number]]   Peripheral Blood    Preliminary result Component Value   Culture No growth after 2 days  [P]                           Pertinent Radiology  Radiology Results: Reviewed    MR Knee Left w/o Contrast    Result Date: 9/23/2024  EXAM: MR KNEE LEFT W/O CONTRAST LOCATION: [facility] DATE: 9/23/2024 INDICATION: knee swelling, eval for fluid collection COMPARISON: Radiographs from 9/22/2024 TECHNIQUE: Unenhanced. FINDINGS: MEDIAL COMPARTMENT: -Meniscus:  There is subtle linear increased intrameniscal signal at the posterior horn which does not extend to the meniscal surface. This does not meet MRI criteria for tear. -Cartilage: Normal. LATERAL COMPARTMENT: -Meniscus: Normal. -Cartilage: Normal. PATELLOFEMORAL COMPARTMENT: -Alignment: Mild lateral patellar tilt and subluxation. -Cartilage: Low-grade cartilage thinning in the patella with a deep cartilage fissure at the median ridge. CRUCIATE LIGAMENTS: -ACL: Normal. -PCL: Normal. COLLATERAL LIGAMENTS: -Medial collateral ligament: Superficial and deep fibers are normal. -Lateral collateral ligament: Normal. POSTEROMEDIAL CORNER: -Distal semimembranosus tendon is normal. -Pes anserine tendons are normal. Posteromedial corner complex ligaments are intact. POSTEROLATERAL CORNER: -Popliteal tendon is intact. No tendinopathy. -Biceps femoris tendon and posterolateral corner complex ligaments are intact. EXTENSOR MECHANISM: -Quadriceps tendon: Normal. -Patellar tendon: Normal. -Patellofemoral ligaments and retinacula: Intact. JOINT: -No joint effusion or synovitis. BONES: -Chronic fragmentation of the tibial tuberosity. No acute fracture or concerning marrow replacing lesion. SOFT TISSUES: -No significant popliteal cyst. There is moderate prepatellar soft tissue swelling. No significant fluid collection. No acute muscular injury or soft tissue mass.     IMPRESSION: 1.  There is moderate prepatellar soft tissue swelling. Cannot exclude trace bursitis. 2.  Intact cruciate ligaments, collateral ligaments, and menisci. 3.  Low-grade patellofemoral compartment chondromalacia. 4.  Mild lateral patellar tilt and subluxation. 5.  Chronic fragmentation of the tibial tuberosity. 6.  No knee joint effusion.     XR Knee Left 3 Views    Result Date: 9/22/2024  EXAM: XR KNEE LEFT 3 VIEWS LOCATION: [facility] DATE: 9/22/2024 INDICATION: Knee pain. COMPARISON: None.     IMPRESSION: Chronic ossicles in the distal  patellar tendon from prior Osgood-Schlatter's disease. Soft tissue fullness anterior to the patella and patellar tendon which could be from bursitis or direct trauma. No acute fracture. Mild lateral subluxation and tilt of the patella. No effusion or calcified intra-articular body.    XR Knee Left G/E 4 Views    Result Date: 9/21/2024  For Patients: As a result of the 21st Century Cures Act, medical imaging exams and procedure reports are released immediately into your electronic medical record. You may view this report before your referring provider. If you have questions, please contact your health care provider. EXAM: XR KNEE 4 VIEWS LEFT LOCATION: [facility] MEDICAL IMAGING DATE: 9/21/2024 INDICATION: Pain Swelling COMPARISON: None.    No acute fractures or dislocation. No knee joint effusion. Large separate portion of the tibial tubercle suggests sequela of prior old avulsion injury or Osgood-Schlatter disease. There is probable prepatellar soft tissue swelling.
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
## 2024-09-25 NOTE — PROGRESS NOTES
Clinic Care Coordination Contact  [facility]/Voicemail    Clinical Data: Care Coordinator Outreach        Left message on patient's voicemail with call back information and requested return call.    Plan: Care Coordinator  via . Care Coordinator will try to reach patient again in 1-2 business days.

## 2024-09-25 NOTE — CONSULTS
Care Management Discharge Note    Discharge Date: 09/25/2024       Discharge Disposition:  home      Additional Information:  10:32 AM  SW met with pt per consult order.  Pt reports he lives alone in an apartment.  He is independent at baseline.  Pt reports he will discharge home and his son will likely transport him.  Pt denied living with his mother and asked that she not be involved in his cares.  Pt denied needing resources.      ID recommending oral abx at discharge.    MELISSA Collins

## 2024-09-25 NOTE — PLAN OF CARE
Problem: Adult Inpatient Plan of Care  Goal: Optimal Comfort and Wellbeing  Outcome: Progressing  Intervention: Monitor Pain and Promote Comfort  Intervention: Provide Person-Centered Care   Goal Outcome Evaluation:       Pt is alert and oriented. Vitally stable, O2  on RA. Pain managed with scheduled Ibuprofen and RN Tylenol. IV antibiotics infused per order. Independent in room. Voiding via urinal.  Calls appropriately.

## 2024-09-25 NOTE — PLAN OF CARE
Problem: Adult Inpatient Plan of Care  Goal: Optimal Comfort and Wellbeing  Outcome: Progressing  Intervention: Monitor Pain and Promote Comfort  Flowsheets  Taken 9/24/2024 1948  Pain Management Interventions:   medication (see MAR)   quiet environment facilitated   cold applied  Taken 9/24/2024 1640  Pain Management Interventions: cold applied  Taken 9/24/2024 1546  Pain Management Interventions:   medication (see MAR)   quiet environment facilitated  Intervention: Provide Person-Centered Care  Flowsheets (Taken 9/24/2024 1948)  Trust Relationship/Rapport:   care explained   reassurance provided     Problem: Infection  Goal: Absence of Infection Signs and Symptoms  Outcome: Progressing  Intervention: Prevent or Manage Infection  Flowsheets (Taken 9/24/2024 1948)  Fever Reduction/Comfort Measures:   fluid intake increased   ice pack(s) applied   lightweight clothing         Pt. A/Ox4 and able to make needs known. Pain has been controlled with PRN oral oxycodone. Afebrile. Pt. Currently receiving scheduled IV vancomycin and piperacillin for septic bursitis in left knee. Pt. Independent with transfers and has urinal at bedside. Pt. Voiding appropriately. Pt. Calls appropriately. POC ongoing.

## 2024-09-26 ENCOUNTER — PATIENT OUTREACH (OUTPATIENT)
Dept: CARE COORDINATION | Facility: CLINIC | Age: 44
End: 2024-09-26
Payer: COMMERCIAL

## 2024-09-26 NOTE — PROGRESS NOTES
Clinic Care Coordination Contact  [facility]/Voicemail    Clinical Data: Care Coordinator Outreach        Left message on patient's voicemail with call back information and requested return call.    Plan: Care Coordinator  via . Care Coordinator will do no further outreaches at this time.

## 2024-09-27 LAB — BACTERIA BLD CULT: NO GROWTH

## 2025-04-07 ENCOUNTER — OFFICE VISIT (OUTPATIENT)
Dept: FAMILY MEDICINE | Facility: CLINIC | Age: 45
End: 2025-04-07
Payer: COMMERCIAL

## 2025-04-07 VITALS
WEIGHT: 147 LBS | BODY MASS INDEX: 22.28 KG/M2 | OXYGEN SATURATION: 96 % | HEART RATE: 123 BPM | HEIGHT: 68 IN | SYSTOLIC BLOOD PRESSURE: 132 MMHG | DIASTOLIC BLOOD PRESSURE: 85 MMHG | RESPIRATION RATE: 20 BRPM | TEMPERATURE: 97.3 F

## 2025-04-07 DIAGNOSIS — G89.29 CHRONIC MIDLINE LOW BACK PAIN WITHOUT SCIATICA: ICD-10-CM

## 2025-04-07 DIAGNOSIS — Z12.11 SCREEN FOR COLON CANCER: Primary | ICD-10-CM

## 2025-04-07 DIAGNOSIS — J30.2 SEASONAL ALLERGIC RHINITIS, UNSPECIFIED TRIGGER: ICD-10-CM

## 2025-04-07 DIAGNOSIS — J45.40 MODERATE PERSISTENT ASTHMA WITHOUT COMPLICATION: ICD-10-CM

## 2025-04-07 DIAGNOSIS — M54.50 CHRONIC MIDLINE LOW BACK PAIN WITHOUT SCIATICA: ICD-10-CM

## 2025-04-07 DIAGNOSIS — H61.23 BILATERAL IMPACTED CERUMEN: ICD-10-CM

## 2025-04-07 DIAGNOSIS — F32.0 CURRENT MILD EPISODE OF MAJOR DEPRESSIVE DISORDER WITHOUT PRIOR EPISODE: ICD-10-CM

## 2025-04-07 DIAGNOSIS — M79.10 MUSCLE PAIN: ICD-10-CM

## 2025-04-07 DIAGNOSIS — J45.40 MODERATE PERSISTENT ASTHMA WITHOUT COMPLICATION: Primary | ICD-10-CM

## 2025-04-07 DIAGNOSIS — M71.10 SEPTIC BURSITIS: ICD-10-CM
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
## 2025-04-07 PROCEDURE — 99214 OFFICE O/P EST MOD 30 MIN: CPT | Mod: GC

## 2025-04-07 PROCEDURE — 3079F DIAST BP 80-89 MM HG: CPT

## 2025-04-07 PROCEDURE — 3075F SYST BP GE 130 - 139MM HG: CPT

## 2025-04-07 RX ORDER — LIDOCAINE 50 MG/G
OINTMENT TOPICAL DAILY PRN
Qty: 240 G | Refills: 1 | Status: SHIPPED | OUTPATIENT
Start: 2025-04-07

## 2025-04-07 RX ORDER — IBUPROFEN 800 MG/1
800 TABLET, FILM COATED ORAL EVERY 8 HOURS PRN
Qty: 100 TABLET | Refills: 3 | Status: SHIPPED | OUTPATIENT
Start: 2025-04-07

## 2025-04-07 RX ORDER — CETIRIZINE HYDROCHLORIDE 10 MG/1
10 TABLET ORAL DAILY
Qty: 30 TABLET | Refills: 2 | Status: SHIPPED | OUTPATIENT
Start: 2025-04-07

## 2025-04-07 RX ORDER — ALBUTEROL SULFATE 0.83 MG/ML
2.5 SOLUTION RESPIRATORY (INHALATION) EVERY 6 HOURS PRN
Qty: 90 ML | Refills: 3 | Status: SHIPPED | OUTPATIENT
Start: 2025-04-07

## 2025-04-07 RX ORDER — ACETAMINOPHEN 325 MG/1
650 TABLET ORAL EVERY 6 HOURS PRN
Qty: 30 TABLET | Refills: 3 | Status: SHIPPED | OUTPATIENT
Start: 2025-04-07

## 2025-04-07 RX ORDER — BUDESONIDE AND FORMOTEROL FUMARATE DIHYDRATE 160; 4.5 UG/1; UG/1
2 AEROSOL RESPIRATORY (INHALATION)
Qty: 20.4 G | Refills: 3 | Status: SHIPPED | OUTPATIENT
Start: 2025-04-07 | End: 2025-04-07

## 2025-04-07 RX ORDER — TIOTROPIUM BROMIDE 18 UG/1
18 CAPSULE ORAL; RESPIRATORY (INHALATION) DAILY
Qty: 30 CAPSULE | Refills: 1 | Status: SHIPPED | OUTPATIENT
Start: 2025-04-07 | End: 2025-04-07

## 2025-04-07 RX ORDER — LIDOCAINE 50 MG/G
OINTMENT TOPICAL DAILY PRN
Qty: 240 G | Refills: 1 | Status: SHIPPED | OUTPATIENT
Start: 2025-04-07 | End: 2025-04-07

## 2025-04-07 RX ORDER — BUPROPION HYDROCHLORIDE 150 MG/1
150 TABLET ORAL EVERY MORNING
Qty: 30 TABLET | Refills: 1 | Status: SHIPPED | OUTPATIENT
Start: 2025-04-07 | End: 2025-04-07

## 2025-04-07 RX ORDER — BUDESONIDE AND FORMOTEROL FUMARATE DIHYDRATE 160; 4.5 UG/1; UG/1
2 AEROSOL RESPIRATORY (INHALATION)
Qty: 20.4 G | Refills: 3 | Status: SHIPPED | OUTPATIENT
Start: 2025-04-07

## 2025-04-07 RX ORDER — BUPROPION HYDROCHLORIDE 150 MG/1
150 TABLET ORAL EVERY MORNING
Qty: 30 TABLET | Refills: 1 | Status: SHIPPED | OUTPATIENT
Start: 2025-04-07

## 2025-04-07 RX ORDER — ALBUTEROL SULFATE 0.83 MG/ML
2.5 SOLUTION RESPIRATORY (INHALATION) EVERY 6 HOURS PRN
Qty: 90 ML | Refills: 3 | Status: SHIPPED | OUTPATIENT
Start: 2025-04-07 | End: 2025-04-07

## 2025-04-07 RX ORDER — TIOTROPIUM BROMIDE 18 UG/1
18 CAPSULE ORAL; RESPIRATORY (INHALATION) DAILY
Qty: 30 CAPSULE | Refills: 1 | Status: SHIPPED | OUTPATIENT
Start: 2025-04-07

## 2025-04-07 RX ORDER — ACETAMINOPHEN 325 MG/1
650 TABLET ORAL EVERY 6 HOURS PRN
Qty: 30 TABLET | Refills: 3 | Status: SHIPPED | OUTPATIENT
Start: 2025-04-07 | End: 2025-04-07

## 2025-04-07 ASSESSMENT — PATIENT HEALTH QUESTIONNAIRE - PHQ9
SUM OF ALL RESPONSES TO PHQ QUESTIONS 1-9: 12
SUM OF ALL RESPONSES TO PHQ QUESTIONS 1-9: 12
10. IF YOU CHECKED OFF ANY PROBLEMS, HOW DIFFICULT HAVE THESE PROBLEMS MADE IT FOR YOU TO DO YOUR WORK, TAKE CARE OF THINGS AT HOME, OR GET ALONG WITH OTHER PEOPLE: EXTREMELY DIFFICULT

## 2025-04-07 ASSESSMENT — ANXIETY QUESTIONNAIRES
3. WORRYING TOO MUCH ABOUT DIFFERENT THINGS: SEVERAL DAYS
2. NOT BEING ABLE TO STOP OR CONTROL WORRYING: SEVERAL DAYS
GAD7 TOTAL SCORE: 5
4. TROUBLE RELAXING: SEVERAL DAYS
GAD7 TOTAL SCORE: 5
7. FEELING AFRAID AS IF SOMETHING AWFUL MIGHT HAPPEN: NOT AT ALL
1. FEELING NERVOUS, ANXIOUS, OR ON EDGE: SEVERAL DAYS
IF YOU CHECKED OFF ANY PROBLEMS ON THIS QUESTIONNAIRE, HOW DIFFICULT HAVE THESE PROBLEMS MADE IT FOR YOU TO DO YOUR WORK, TAKE CARE OF THINGS AT HOME, OR GET ALONG WITH OTHER PEOPLE: VERY DIFFICULT
8. IF YOU CHECKED OFF ANY PROBLEMS, HOW DIFFICULT HAVE THESE MADE IT FOR YOU TO DO YOUR WORK, TAKE CARE OF THINGS AT HOME, OR GET ALONG WITH OTHER PEOPLE?: VERY DIFFICULT
6. BECOMING EASILY ANNOYED OR IRRITABLE: NOT AT ALL
5. BEING SO RESTLESS THAT IT IS HARD TO SIT STILL: SEVERAL DAYS
7. FEELING AFRAID AS IF SOMETHING AWFUL MIGHT HAPPEN: NOT AT ALL
GAD7 TOTAL SCORE: 5

## 2025-04-07 ASSESSMENT — ASTHMA QUESTIONNAIRES
ACT_TOTALSCORE: 8
QUESTION_3 LAST FOUR WEEKS HOW OFTEN DID YOUR ASTHMA SYMPTOMS (WHEEZING, COUGHING, SHORTNESS OF BREATH, CHEST TIGHTNESS OR PAIN) WAKE YOU UP AT NIGHT OR EARLIER THAN USUAL IN THE MORNING: FOUR OR MORE NIGHTS A WEEK
QUESTION_1 LAST FOUR WEEKS HOW MUCH OF THE TIME DID YOUR ASTHMA KEEP YOU FROM GETTING AS MUCH DONE AT WORK, SCHOOL OR AT HOME: MOST OF THE TIME
EMERGENCY_ROOM_LAST_YEAR_TOTAL: TWO
QUESTION_5 LAST FOUR WEEKS HOW WOULD YOU RATE YOUR ASTHMA CONTROL: POORLY CONTROLLED
QUESTION_2 LAST FOUR WEEKS HOW OFTEN HAVE YOU HAD SHORTNESS OF BREATH: ONCE A DAY
QUESTION_4 LAST FOUR WEEKS HOW OFTEN HAVE YOU USED YOUR RESCUE INHALER OR NEBULIZER MEDICATION (SUCH AS ALBUTEROL): THREE OR MORE TIMES PER DAY

## 2025-04-07 NOTE — PROGRESS NOTES
Faculty Supervision of Residents   I have examined this patient and the medical care has been evaluated and discussed with the resident. See resident note outlining our discussion.    Yessy Watson MD
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
## 2025-04-07 NOTE — PROGRESS NOTES
DME ORDER  What was ordered? Neb  What location did patient pick? [place] [facility]  Was copy given to patient? Yes  Was DME order copy faxed to patient preferred DME location? Yes  What is the fax number for preferred location? [phone]    Use [facility] for top 3 Addresses         [facility]   [address]  [place], [place] [address]  [phone]

## 2025-04-07 NOTE — PATIENT INSTRUCTIONS
[facility]   [address]  [place], [place] [address]  [phone]

## 2025-04-10 ENCOUNTER — TELEPHONE (OUTPATIENT)
Dept: FAMILY MEDICINE | Facility: CLINIC | Age: 45
End: 2025-04-10
Payer: COMMERCIAL

## 2025-04-10 NOTE — TELEPHONE ENCOUNTER
Prior Authorization Retail Medication Request    Medication/Dose: Tiotropium Bromide 18 MCG  Diagnosis and ICD code (if different than what is on RX):    New/renewal/insurance change PA/secondary ins. PA:  Previously Tried and Failed:    Rationale:      Insurance   Primary: CMM: [id number]  Insurance ID:      Secondary (if applicable):

## 2025-04-13 NOTE — TELEPHONE ENCOUNTER
PA Initiation    Medication: SPIRIVA HANDIHALER 18 MCG IN CAPS  Insurance Company: [employer] - Phone [phone] Fax [phone]  Pharmacy Filling the Rx: [id number] [place], [place] - [address] AT [facility] [address]  Filling Pharmacy Phone: [phone]  Filling Pharmacy Fax: [phone]  Start Date: 4/13/2025

## 2025-04-14 NOTE — TELEPHONE ENCOUNTER
Prior Authorization Not Needed per Insurance    Medication: SPIRIVA HANDIHALER 18 MCG IN CAPS  Insurance Company: 5to[employer] - Phone [phone] Fax [phone]  Expected CoPay: $    Pharmacy Filling the Rx: [id number] [address]  Pharmacy Notified: YES  Patient Notified:  **Instructed pharmacy to notify patient when script is ready to /ship.**    Insurance covers brand name